# Patient Record
Sex: MALE | Race: WHITE | Employment: FULL TIME | URBAN - METROPOLITAN AREA
[De-identification: names, ages, dates, MRNs, and addresses within clinical notes are randomized per-mention and may not be internally consistent; named-entity substitution may affect disease eponyms.]

---

## 2017-07-21 ENCOUNTER — HOSPITAL ENCOUNTER (OUTPATIENT)
Dept: MRI IMAGING | Age: 32
Discharge: HOME | End: 2017-07-21
Attending: ORTHOPAEDIC SURGERY
Payer: COMMERCIAL

## 2017-07-21 DIAGNOSIS — M25.362 INSTABILITY OF LEFT KNEE JOINT: ICD-10-CM

## 2017-07-21 PROCEDURE — 73721 MRI JNT OF LWR EXTRE W/O DYE: CPT

## 2022-08-13 ENCOUNTER — APPOINTMENT (OUTPATIENT)
Dept: CT IMAGING | Age: 37
DRG: 291 | End: 2022-08-13
Attending: EMERGENCY MEDICINE
Payer: COMMERCIAL

## 2022-08-13 ENCOUNTER — APPOINTMENT (OUTPATIENT)
Dept: GENERAL RADIOLOGY | Age: 37
DRG: 291 | End: 2022-08-13
Attending: EMERGENCY MEDICINE
Payer: COMMERCIAL

## 2022-08-13 ENCOUNTER — HOSPITAL ENCOUNTER (INPATIENT)
Age: 37
LOS: 6 days | Discharge: HOME | DRG: 291 | End: 2022-08-19
Attending: EMERGENCY MEDICINE | Admitting: HOSPITALIST
Payer: COMMERCIAL

## 2022-08-13 DIAGNOSIS — I50.9 CONGESTIVE HEART FAILURE, UNSPECIFIED HF CHRONICITY, UNSPECIFIED HEART FAILURE TYPE (HCC): Primary | ICD-10-CM

## 2022-08-13 PROBLEM — R60.0 BILATERAL LEG EDEMA: Status: ACTIVE | Noted: 2022-08-13

## 2022-08-13 PROBLEM — N50.89 SCROTAL EDEMA: Status: ACTIVE | Noted: 2022-08-13

## 2022-08-13 PROBLEM — I51.7 CARDIOMEGALY: Status: ACTIVE | Noted: 2022-08-13

## 2022-08-13 LAB
ALBUMIN SERPL-MCNC: 3.2 G/DL (ref 3.5–5)
ALBUMIN/GLOB SERPL: 1.1 {RATIO} (ref 1.1–2.2)
ALP SERPL-CCNC: 98 U/L (ref 45–117)
ALT SERPL-CCNC: 52 U/L (ref 12–78)
AMPHET UR QL SCN: NEGATIVE
ANION GAP SERPL CALC-SCNC: 8 MMOL/L (ref 5–15)
APPEARANCE UR: CLEAR
AST SERPL-CCNC: 48 U/L (ref 15–37)
BACTERIA URNS QL MICRO: NEGATIVE /HPF
BARBITURATES UR QL SCN: NEGATIVE
BASOPHILS # BLD: 0.1 K/UL (ref 0–0.1)
BASOPHILS NFR BLD: 1 % (ref 0–1)
BENZODIAZ UR QL: NEGATIVE
BILIRUB SERPL-MCNC: 0.8 MG/DL (ref 0.2–1)
BILIRUB UR QL: NEGATIVE
BNP SERPL-MCNC: 4509 PG/ML
BUN SERPL-MCNC: 17 MG/DL (ref 6–20)
BUN/CREAT SERPL: 16 (ref 12–20)
CALCIUM SERPL-MCNC: 8.6 MG/DL (ref 8.5–10.1)
CANNABINOIDS UR QL SCN: NEGATIVE
CHLORIDE SERPL-SCNC: 104 MMOL/L (ref 97–108)
CO2 SERPL-SCNC: 27 MMOL/L (ref 21–32)
COCAINE UR QL SCN: NEGATIVE
COLOR UR: ABNORMAL
COMMENT, HOLDF: NORMAL
COVID-19 RAPID TEST, COVR: NOT DETECTED
CREAT SERPL-MCNC: 1.06 MG/DL (ref 0.7–1.3)
CREAT UR-MCNC: <13 MG/DL
DIFFERENTIAL METHOD BLD: ABNORMAL
DRUG SCRN COMMENT,DRGCM: NORMAL
EOSINOPHIL # BLD: 0.1 K/UL (ref 0–0.4)
EOSINOPHIL NFR BLD: 2 % (ref 0–7)
EPITH CASTS URNS QL MICRO: ABNORMAL /LPF
ERYTHROCYTE [DISTWIDTH] IN BLOOD BY AUTOMATED COUNT: 14.7 % (ref 11.5–14.5)
GLOBULIN SER CALC-MCNC: 3 G/DL (ref 2–4)
GLUCOSE SERPL-MCNC: 120 MG/DL (ref 65–100)
GLUCOSE UR STRIP.AUTO-MCNC: NEGATIVE MG/DL
HCT VFR BLD AUTO: 44.2 % (ref 36.6–50.3)
HGB BLD-MCNC: 14 G/DL (ref 12.1–17)
HGB UR QL STRIP: NEGATIVE
IMM GRANULOCYTES # BLD AUTO: 0 K/UL (ref 0–0.04)
IMM GRANULOCYTES NFR BLD AUTO: 0 % (ref 0–0.5)
KETONES UR QL STRIP.AUTO: NEGATIVE MG/DL
LACTATE SERPL-SCNC: 1.4 MMOL/L (ref 0.4–2)
LEUKOCYTE ESTERASE UR QL STRIP.AUTO: NEGATIVE
LYMPHOCYTES # BLD: 1.1 K/UL (ref 0.8–3.5)
LYMPHOCYTES NFR BLD: 17 % (ref 12–49)
MCH RBC QN AUTO: 27.7 PG (ref 26–34)
MCHC RBC AUTO-ENTMCNC: 31.7 G/DL (ref 30–36.5)
MCV RBC AUTO: 87.4 FL (ref 80–99)
METHADONE UR QL: NEGATIVE
MONOCYTES # BLD: 0.5 K/UL (ref 0–1)
MONOCYTES NFR BLD: 8 % (ref 5–13)
NEUTS SEG # BLD: 4.5 K/UL (ref 1.8–8)
NEUTS SEG NFR BLD: 72 % (ref 32–75)
NITRITE UR QL STRIP.AUTO: NEGATIVE
NRBC # BLD: 0 K/UL (ref 0–0.01)
NRBC BLD-RTO: 0 PER 100 WBC
OPIATES UR QL: NEGATIVE
OTHER,OTHU: ABNORMAL
PCP UR QL: NEGATIVE
PH UR STRIP: 5.5 [PH] (ref 5–8)
PLATELET # BLD AUTO: 231 K/UL (ref 150–400)
PMV BLD AUTO: 10.4 FL (ref 8.9–12.9)
POTASSIUM SERPL-SCNC: 4.2 MMOL/L (ref 3.5–5.1)
PROT SERPL-MCNC: 6.2 G/DL (ref 6.4–8.2)
PROT UR STRIP-MCNC: 100 MG/DL
PROT UR-MCNC: 7 MG/DL (ref 0–11.9)
PROT/CREAT UR-RTO: <0.5
RBC # BLD AUTO: 5.06 M/UL (ref 4.1–5.7)
RBC #/AREA URNS HPF: ABNORMAL /HPF (ref 0–5)
SAMPLES BEING HELD,HOLD: NORMAL
SODIUM SERPL-SCNC: 139 MMOL/L (ref 136–145)
SOURCE, COVRS: NORMAL
SP GR UR REFRACTOMETRY: 1.01
TROPONIN-HIGH SENSITIVITY: 85 NG/L (ref 0–76)
UROBILINOGEN UR QL STRIP.AUTO: 0.2 EU/DL (ref 0.2–1)
WBC # BLD AUTO: 6.3 K/UL (ref 4.1–11.1)
WBC URNS QL MICRO: ABNORMAL /HPF (ref 0–4)

## 2022-08-13 PROCEDURE — 81001 URINALYSIS AUTO W/SCOPE: CPT

## 2022-08-13 PROCEDURE — 36415 COLL VENOUS BLD VENIPUNCTURE: CPT

## 2022-08-13 PROCEDURE — 80307 DRUG TEST PRSMV CHEM ANLYZR: CPT

## 2022-08-13 PROCEDURE — 74011250636 HC RX REV CODE- 250/636: Performed by: EMERGENCY MEDICINE

## 2022-08-13 PROCEDURE — 93005 ELECTROCARDIOGRAM TRACING: CPT

## 2022-08-13 PROCEDURE — 71045 X-RAY EXAM CHEST 1 VIEW: CPT

## 2022-08-13 PROCEDURE — 74011250637 HC RX REV CODE- 250/637: Performed by: HOSPITALIST

## 2022-08-13 PROCEDURE — 99285 EMERGENCY DEPT VISIT HI MDM: CPT

## 2022-08-13 PROCEDURE — 83880 ASSAY OF NATRIURETIC PEPTIDE: CPT

## 2022-08-13 PROCEDURE — 84484 ASSAY OF TROPONIN QUANT: CPT

## 2022-08-13 PROCEDURE — 74011000258 HC RX REV CODE- 258: Performed by: HOSPITALIST

## 2022-08-13 PROCEDURE — 87635 SARS-COV-2 COVID-19 AMP PRB: CPT

## 2022-08-13 PROCEDURE — 74011000250 HC RX REV CODE- 250: Performed by: HOSPITALIST

## 2022-08-13 PROCEDURE — 65270000046 HC RM TELEMETRY

## 2022-08-13 PROCEDURE — 84156 ASSAY OF PROTEIN URINE: CPT

## 2022-08-13 PROCEDURE — 74011250636 HC RX REV CODE- 250/636: Performed by: HOSPITALIST

## 2022-08-13 PROCEDURE — 87040 BLOOD CULTURE FOR BACTERIA: CPT

## 2022-08-13 PROCEDURE — 85025 COMPLETE CBC W/AUTO DIFF WBC: CPT

## 2022-08-13 PROCEDURE — 83605 ASSAY OF LACTIC ACID: CPT

## 2022-08-13 PROCEDURE — 80053 COMPREHEN METABOLIC PANEL: CPT

## 2022-08-13 RX ORDER — ENOXAPARIN SODIUM 100 MG/ML
40 INJECTION SUBCUTANEOUS 2 TIMES DAILY
Status: DISCONTINUED | OUTPATIENT
Start: 2022-08-13 | End: 2022-08-19 | Stop reason: HOSPADM

## 2022-08-13 RX ORDER — ACETAMINOPHEN 325 MG/1
650 TABLET ORAL
Status: DISCONTINUED | OUTPATIENT
Start: 2022-08-13 | End: 2022-08-19 | Stop reason: HOSPADM

## 2022-08-13 RX ORDER — IBUPROFEN 800 MG/1
800 TABLET ORAL
COMMUNITY
End: 2022-08-19

## 2022-08-13 RX ORDER — ONDANSETRON 4 MG/1
4 TABLET, ORALLY DISINTEGRATING ORAL
Status: DISCONTINUED | OUTPATIENT
Start: 2022-08-13 | End: 2022-08-19 | Stop reason: HOSPADM

## 2022-08-13 RX ORDER — GUAIFENESIN 100 MG/5ML
81 LIQUID (ML) ORAL DAILY
Status: DISCONTINUED | OUTPATIENT
Start: 2022-08-14 | End: 2022-08-19 | Stop reason: HOSPADM

## 2022-08-13 RX ORDER — ASPIRIN 325 MG/1
100 TABLET, FILM COATED ORAL DAILY
Status: DISCONTINUED | OUTPATIENT
Start: 2022-08-14 | End: 2022-08-19 | Stop reason: HOSPADM

## 2022-08-13 RX ORDER — DIAZEPAM 5 MG/1
20 TABLET ORAL
Status: DISCONTINUED | OUTPATIENT
Start: 2022-08-13 | End: 2022-08-19 | Stop reason: HOSPADM

## 2022-08-13 RX ORDER — ACETAMINOPHEN 650 MG/1
650 SUPPOSITORY RECTAL
Status: DISCONTINUED | OUTPATIENT
Start: 2022-08-13 | End: 2022-08-19 | Stop reason: HOSPADM

## 2022-08-13 RX ORDER — SODIUM CHLORIDE 0.9 % (FLUSH) 0.9 %
5-40 SYRINGE (ML) INJECTION EVERY 8 HOURS
Status: DISCONTINUED | OUTPATIENT
Start: 2022-08-13 | End: 2022-08-19 | Stop reason: HOSPADM

## 2022-08-13 RX ORDER — POLYETHYLENE GLYCOL 3350 17 G/17G
17 POWDER, FOR SOLUTION ORAL DAILY PRN
Status: DISCONTINUED | OUTPATIENT
Start: 2022-08-13 | End: 2022-08-19 | Stop reason: HOSPADM

## 2022-08-13 RX ORDER — ONDANSETRON 2 MG/ML
4 INJECTION INTRAMUSCULAR; INTRAVENOUS
Status: DISCONTINUED | OUTPATIENT
Start: 2022-08-13 | End: 2022-08-19 | Stop reason: HOSPADM

## 2022-08-13 RX ORDER — DIPHENHYDRAMINE HCL 12.5MG/5ML
25 LIQUID (ML) ORAL
Status: DISCONTINUED | OUTPATIENT
Start: 2022-08-13 | End: 2022-08-19 | Stop reason: HOSPADM

## 2022-08-13 RX ORDER — GUAIFENESIN 100 MG/5ML
81 LIQUID (ML) ORAL DAILY
COMMUNITY

## 2022-08-13 RX ORDER — FOLIC ACID 1 MG/1
1 TABLET ORAL DAILY
Status: DISCONTINUED | OUTPATIENT
Start: 2022-08-14 | End: 2022-08-19 | Stop reason: HOSPADM

## 2022-08-13 RX ORDER — FUROSEMIDE 10 MG/ML
60 INJECTION INTRAMUSCULAR; INTRAVENOUS ONCE
Status: COMPLETED | OUTPATIENT
Start: 2022-08-13 | End: 2022-08-13

## 2022-08-13 RX ORDER — POTASSIUM CHLORIDE 20 MEQ/1
20 TABLET, EXTENDED RELEASE ORAL DAILY
Status: DISCONTINUED | OUTPATIENT
Start: 2022-08-14 | End: 2022-08-15

## 2022-08-13 RX ORDER — SODIUM CHLORIDE 0.9 % (FLUSH) 0.9 %
5-40 SYRINGE (ML) INJECTION AS NEEDED
Status: DISCONTINUED | OUTPATIENT
Start: 2022-08-13 | End: 2022-08-19 | Stop reason: HOSPADM

## 2022-08-13 RX ORDER — LISINOPRIL 10 MG/1
10 TABLET ORAL DAILY
Status: DISCONTINUED | OUTPATIENT
Start: 2022-08-13 | End: 2022-08-16

## 2022-08-13 RX ORDER — FUROSEMIDE 10 MG/ML
60 INJECTION INTRAMUSCULAR; INTRAVENOUS 2 TIMES DAILY
Status: DISCONTINUED | OUTPATIENT
Start: 2022-08-13 | End: 2022-08-16

## 2022-08-13 RX ORDER — DIAZEPAM 5 MG/1
10 TABLET ORAL
Status: DISCONTINUED | OUTPATIENT
Start: 2022-08-13 | End: 2022-08-19 | Stop reason: HOSPADM

## 2022-08-13 RX ADMIN — VANCOMYCIN HYDROCHLORIDE 2500 MG: 10 INJECTION, POWDER, LYOPHILIZED, FOR SOLUTION INTRAVENOUS at 18:16

## 2022-08-13 RX ADMIN — SODIUM CHLORIDE 1 G: 900 INJECTION INTRAVENOUS at 21:58

## 2022-08-13 RX ADMIN — SODIUM CHLORIDE, PRESERVATIVE FREE 10 ML: 5 INJECTION INTRAVENOUS at 18:29

## 2022-08-13 RX ADMIN — SODIUM CHLORIDE 500 ML: 9 INJECTION, SOLUTION INTRAVENOUS at 14:59

## 2022-08-13 RX ADMIN — FUROSEMIDE 60 MG: 10 INJECTION, SOLUTION INTRAMUSCULAR; INTRAVENOUS at 16:51

## 2022-08-13 RX ADMIN — ENOXAPARIN SODIUM 40 MG: 100 INJECTION SUBCUTANEOUS at 21:56

## 2022-08-13 RX ADMIN — SODIUM CHLORIDE, PRESERVATIVE FREE 10 ML: 5 INJECTION INTRAVENOUS at 21:57

## 2022-08-13 RX ADMIN — FUROSEMIDE 60 MG: 10 INJECTION, SOLUTION INTRAMUSCULAR; INTRAVENOUS at 20:05

## 2022-08-13 RX ADMIN — LISINOPRIL 10 MG: 5 TABLET ORAL at 18:27

## 2022-08-13 NOTE — PROGRESS NOTES
Report received from Mika Silverio, [facility], later [facility].

## 2022-08-13 NOTE — ED NOTES
TRANSFER - OUT REPORT:    Verbal report given to Liv(name) on Mel Denney  being transferred to CDU (unit) for routine progression of care       Report consisted of patients Situation, Background, Assessment and   Recommendations(SBAR). Information from the following report(s) SBAR, Kardex, ED Summary, MAR, and Recent Results was reviewed with the receiving nurse. Lines:   Peripheral IV 08/13/22 Right Antecubital (Active)   Site Assessment Clean, dry, & intact 08/13/22 1455   Phlebitis Assessment 0 08/13/22 1455   Infiltration Assessment 0 08/13/22 1455   Dressing Status Clean, dry, & intact 08/13/22 1455   Dressing Type Tape;Transparent 08/13/22 1455   Hub Color/Line Status Pink 08/13/22 1455        Opportunity for questions and clarification was provided.

## 2022-08-13 NOTE — H&P
Hospitalist Admission Note    NAME: Karolina Kessler   :  1985   MRN:  [id number]     Date/Time:  2022 4:58 PM    Patient PCP: None    Please note that this dictation was completed with [employer], the computer voice recognition software. Quite often unanticipated grammatical, syntax, homophones, and other interpretive errors are inadvertently transcribed by the computer software. Please disregard these errors. Please excuse any errors that have escaped final proofreading  ______________________________________________________________________   Assessment & Plan:  New onset chf, POA with b/l tense leg edema, abdominal wall edema, scrotal edema, elevated probnp, weight gain of 30# in 3 weeks  --due to undetected HTN; ?nephrotic syndrome since has proteinuria  --start on lasix 60mg IV bid  --echo  --ekg and troponin pending  --cardiology consult  --check tsh  --check UDS    Elevated blood pressures likely HTN  --/112  --start lisinopril    Proteinuria, r/o nephrotic syndrome  --check urine protein/Cr ratio  --consider nephrology consult    Bilateral leg cellulitis and abdominal wall cellulitis, POA  Sinus tachycardia  --check blood cultures  --mrsa nasal swab  --put on rocephin and vancomycin    Excessive alcohol use  --drinks 20-25 shots of bourbon or pints of beer a week  --monitor for withdrawal  --MVI, thiamine, folic acid. --CIWA with prn oral valium    Elevated AST  --may be from hepatic congestion from chf or alcohol. Monitor    Obesity  Body mass index is 46.02 kg/m². Code: full  DVT prophylaxis: lovenox  Surrogate decision maker:  wife [facility] AURA BARRAGAN [phone]    Needs a pcp at discharge.   Does not go see a doctor regularly        Subjective:   CHIEF COMPLAINT:  testicular swelling, b/l leg swelling, SOB, orthopnea    HISTORY OF PRESENT ILLNESS:     Karolina Kessler is a 44 y.o. male with PMH papilledema treated with serial LP for pressure reduction, Bell's palsy affecting left face presents for above symptoms. Patient with 3 weeks of progressive SOB, primarily MORENO, orthopnea. For past 3 weeks, he has been unable to lay down at night to sleep and been sleeping in on his knees in prayer position leaning against bed. About 2 weeks ago, developed a rash in b/l medial thighs with redness, blisters and swelling that spread distally into lower legs and feet. Rash was itching. He applied lotrimin cream and itching resolved. He also has calluses in heels and right heel cracked and bled for a short time. He has also developed swelling, redness of abdominal wall. This morning, testicles are swollen which prompted him to come to ER for evaluation. CXR shows CMO, mild pulmonary vascular prominence and left pleural effusion vs. Left hemidiaphragm elevation. CT abdomen/chest ordered by ER but he's unable to complete due to orthopnea. We were asked to admit for work up and evaluation of the above problems. Past Medical History:   Diagnosis Date    Bell's palsy     x 2; left side    Papilledema of both eyes due to increased intracranial pressure       History reviewed. No pertinent surgical history. Social History     Tobacco Use    Smoking status: Not on file    Smokeless tobacco: Not on file   Substance Use Topics    Alcohol use: Not on file      Drug use:        Family History   Problem Relation Age of Onset    Thyroid Disease Mother     Cancer Paternal Grandmother     Cancer Paternal Grandfather       Allergies   Allergen Reactions    Penicillins Hives        Prior to Admission medications    Medication Sig Start Date End Date Taking? Authorizing Provider   aspirin 81 mg chewable tablet Take 81 mg by mouth in the morning. Yes Other, MD Jelena     REVIEW OF SYSTEMS:  POSITIVE= Bold.   Negative = normal text  General:  fever, chills, sweats, generalized weakness, weight gain 30#, loss of appetite  Eyes:  blurred vision, eye pain, loss of vision, diplopia  Ear Nose and Throat: rhinorrhea, pharyngitis  Respiratory:   cough, sputum production, SOB, wheezing, MORENO, pleuritic pain  Cardiology:  chest pain, palpitations, orthopnea, PND, edema, syncope   Gastrointestinal:  abdominal pain, N/V, dysphagia, diarrhea, constipation, bleeding  Genitourinary:  frequency, urgency, dysuria, hematuria, incontinence  Muskuloskeletal :  arthralgia, myalgia  Hematology:  easy bruising, bleeding, lymphadenopathy  Dermatological:  rash, ulceration, pruritis  Endocrine:  hot flashes or polydipsia  Neurological:  headache, dizziness, confusion, focal weakness, paresthesia, memory loss, gait disturbance  Psychological: anxiety, depression, agitation      Objective:   VITALS:    Visit Vitals  /87   Pulse (!) 104   Temp 98.1 °F (36.7 °C)   Resp 20   Ht 6' 3\" (1.905 m)   Wt (!) 167 kg (368 lb 2.7 oz)   SpO2 97%   BMI 46.02 kg/m²     Temp (24hrs), Av.1 °F (36.7 °C), Min:98.1 °F (36.7 °C), Max:98.1 °F (36.7 °C)    Body mass index is 46.02 kg/m². PHYSICAL EXAM:    General:    Alert, overweight male, cooperative, no distress, appears stated age. HEENT: Atraumatic, anicteric sclerae, pink conjunctivae     No oral ulcers, mucosa moist, throat clear. Hearing intact. Neck:  Supple, symmetrical,  thyroid: non tender  Lungs:   B/l lower lobe crackles. No Wheezing or Rhonchi. Chest wall:  No tenderness  No Accessory muscle use. Heart:   Regular  rhythm,  tachycardic, No  murmur   No gallop. Tense edema entire legs and feet b/l  Abdomen:   Soft, non-tender. Not distended. Bowel sounds normal. No masses. +redness and soft tissue of abdominal wall with increased warmth.  +striae  Extremities: No cyanosis. No clubbing. Bilateral legs red from toes to thighs. Hyperpigmented scarring right medial thigh, dried scaly skin left thigh from excoriation. Calluses heels that are cracked.   Skin:     Not pale Not Jaundiced  Diffuse redness with increased warmth abdominal wall and lower legs.                    Psych:  Good insight. Not depressed. Not anxious or agitated. Neurologic: EOMs intact. No facial asymmetry. No aphasia or slurred speech. Symmetrical strength, Alert and oriented X 3. Peripheral pulse: Right, DP, 2+, left DP 1+ but limited by tense edema feet    IMAGING RESULTS:   []       I have personally reviewed the actual   []     CXR  []     CT scan  CXR:  CT :  EKG:   ________________________________________________________________________  Care Plan discussed with:    Comments   Patient y    [facility]:      ________________________________________________________________________  Prophylaxis:  GI none   DVT lovenox   ________________________________________________________________________  Recommended Disposition:   Home with Family y   HH/PT/OT/RN    SNF/LTC    MATTY    ________________________________________________________________________  Code Status:  Full Code y   DNR/DNI    ________________________________________________________________________  TOTAL TIME:  55 minutes      Comments     Reviewed previous records   >50% of visit spent in counseling and coordination of care  Discussion with patient and/or family and questions answered         ______________________________________________________________________  Lamont Santos MD      Procedures: see electronic medical records for all procedures/Xrays and details which were not copied into this note but were reviewed prior to creation of Plan.     LAB DATA REVIEWED:    Recent Results (from the past 24 hour(s))   CBC WITH AUTOMATED DIFF    Collection Time: 08/13/22  2:52 PM   Result Value Ref Range    WBC 6.3 4.1 - 11.1 K/uL    RBC 5.06 4.10 - 5.70 M/uL    HGB 14.0 12.1 - 17.0 g/dL    HCT 44.2 36.6 - 50.3 %    MCV 87.4 80.0 - 99.0 FL    MCH 27.7 26.0 - 34.0 PG    MCHC 31.7 30.0 - 36.5 g/dL    RDW 14.7 (H) 11.5 - 14.5 %    PLATELET 311 151 - 595 K/uL    MPV 10.4 8.9 - 12.9 FL    NRBC 0.0 0  WBC    ABSOLUTE NRBC 0.00 0.00 - 0.01 K/uL    NEUTROPHILS 72 32 - 75 %    LYMPHOCYTES 17 12 - 49 %    MONOCYTES 8 5 - 13 %    EOSINOPHILS 2 0 - 7 %    BASOPHILS 1 0 - 1 %    IMMATURE GRANULOCYTES 0 0.0 - 0.5 %    ABS. NEUTROPHILS 4.5 1.8 - 8.0 K/UL    ABS. LYMPHOCYTES 1.1 0.8 - 3.5 K/UL    ABS. MONOCYTES 0.5 0.0 - 1.0 K/UL    ABS. EOSINOPHILS 0.1 0.0 - 0.4 K/UL    ABS. BASOPHILS 0.1 0.0 - 0.1 K/UL    ABS. IMM. GRANS. 0.0 0.00 - 0.04 K/UL    DF AUTOMATED     NT-PRO BNP    Collection Time: 08/13/22  2:52 PM   Result Value Ref Range    NT pro-BNP 4,509 (H) <780 PG/ML   METABOLIC PANEL, COMPREHENSIVE    Collection Time: 08/13/22  2:52 PM   Result Value Ref Range    Sodium 139 136 - 145 mmol/L    Potassium 4.2 3.5 - 5.1 mmol/L    Chloride 104 97 - 108 mmol/L    CO2 27 21 - 32 mmol/L    Anion gap 8 5 - 15 mmol/L    Glucose 120 (H) 65 - 100 mg/dL    BUN 17 6 - 20 MG/DL    Creatinine 1.06 0.70 - 1.30 MG/DL    BUN/Creatinine ratio 16 12 - 20      GFR est AA >60 >60 ml/min/1.73m2    GFR est non-AA >60 >60 ml/min/1.73m2    Calcium 8.6 8.5 - 10.1 MG/DL    Bilirubin, total 0.8 0.2 - 1.0 MG/DL    ALT (SGPT) 52 12 - 78 U/L    AST (SGOT) 48 (H) 15 - 37 U/L    Alk.  phosphatase 98 45 - 117 U/L    Protein, total 6.2 (L) 6.4 - 8.2 g/dL    Albumin 3.2 (L) 3.5 - 5.0 g/dL    Globulin 3.0 2.0 - 4.0 g/dL    A-G Ratio 1.1 1.1 - 2.2     URINALYSIS W/ RFLX MICROSCOPIC    Collection Time: 08/13/22  2:52 PM   Result Value Ref Range    Color YELLOW/STRAW      Appearance CLEAR CLEAR      Specific gravity 1.010      pH (UA) 5.5 5.0 - 8.0      Protein 100 (A) NEG mg/dL    Glucose Negative NEG mg/dL    Ketone Negative NEG mg/dL    Bilirubin Negative NEG      Blood Negative NEG      Urobilinogen 0.2 0.2 - 1.0 EU/dL    Nitrites Negative NEG      Leukocyte Esterase Negative NEG      WBC 0-4 0 - 4 /hpf    RBC 0-5 0 - 5 /hpf    Epithelial cells MODERATE (A) FEW /lpf    Bacteria Negative NEG /hpf    Other: Renal Epithelial cells Present     TROPONIN-HIGH SENSITIVITY    Collection Time: 08/13/22  2:52 PM   Result Value Ref Range    Troponin-High Sensitivity 85 (H) 0 - 76 ng/L   SAMPLES BEING HELD    Collection Time: 08/13/22  4:44 PM   Result Value Ref Range    SAMPLES BEING HELD PST     COMMENT        Add-on orders for these samples will be processed based on acceptable specimen integrity and analyte stability, which may vary by analyte.    EKG, 12 LEAD, INITIAL    Collection Time: 08/13/22  4:50 PM   Result Value Ref Range    Ventricular Rate 104 BPM    Atrial Rate 104 BPM    P-R Interval 132 ms    QRS Duration 86 ms    Q-T Interval 360 ms    QTC Calculation (Bezet) 473 ms    Calculated P Axis 53 degrees    Calculated R Axis 36 degrees    Calculated T Axis 75 degrees    Diagnosis       Sinus tachycardia with occasional premature ventricular complexes  Possible Left atrial enlargement  Nonspecific T wave abnormality  No previous ECGs available

## 2022-08-13 NOTE — PROGRESS NOTES
Pharmacy Antimicrobial Kinetic Dosing    Indication for Antimicrobials: Cellulitis      Current Regimen of Each Antimicrobial:  Vancomycin 2500 mg (max) load then per pharmacy (Start Date 22; Day # 1)  Ceftriaxone 1 gm q24h start  day 1 of 7    Previous Antimicrobial Therapy:   (Start Date ; Day    Goal Level: AUC: 400-600 mg/hr/Liter/day    Date Dose & Interval Measured (mcg/mL) Predicted AUC/GARY                       Date & time of next level: TBD    Dosing calculator used: [employer] calculator    Significant Positive Cultures:   CT of abd. - Pending       Conditions for Dosing Consideration: OBESE BMI 46.02; Wt verified with ER RN  ED notes indicates patient has gained 30 lbs in 3 weeks. Labs:  Recent Labs     22  1452   CREA 1.06   BUN 17     Recent Labs     22  1452   WBC 6.3     Temp (24hrs), Av.1 °F (36.7 °C), Min:98.1 °F (36.7 °C), Max:98.1 °F (36.7 °C)        Creatinine Clearance (mL/min):   CrCl (Ideal Body Weight): 112.9   If actual weight < IBW: CrCl (Actual Body Weight) 223.2    Impression/Plan:   Vancomycin Load =14.9 mg/kg = 2500 mg (Max dose). Maintenance dose of 1000 mg q8h predicted AUC//15.2    Continue Ceftriaxone 1 gm Q24H  Antimicrobial stop date Ceftriaxone 7 days; Vanc TBD      Pharmacy will follow daily and adjust medications as appropriate for renal function and/or serum levels.     Thank you,  Vivian Gibbs, [facility]    Vancomycin Dosing Document    Documents located on pharmacy Teams site: Clinical Practice -> Antimicrobial Stewardship -> Antibiotics_Vancomycin     Aminoglycoside Dosing Document    Documents located on pharmacy Teams site: Clinical Practice -> Antimicrobial Stewardship -> Antibiotics_Aminoglycosides

## 2022-08-13 NOTE — PROGRESS NOTES
P&T-Approved DVT Prophylaxis Dosing    Per P&T Committee-approved protocol enoxaparin has been adjusted to enoxaparin 40mg SQ BID based on weight and renal function as shown in the table below.          TIMOTHY Patterson

## 2022-08-13 NOTE — ED PROVIDER NOTES
EMERGENCY DEPARTMENT HISTORY AND PHYSICAL EXAM      Date: 8/13/2022  Patient Name: Natalio Lebron    History of Presenting Illness     Chief Complaint   Patient presents with    Testicle Pain     Woke up this morning with testicular swelling \"three times \" swollen    Abdominal Pain     Five days of bloated and tightness in abdomen    Peripheral Edema     Woke up just less that two weeks ago with swelling in both lower legs with tightness and blisters that he scratched and popped. HPI: Natalio Lebron, 44 y.o. male with no significant medical history presenting to ED with abdominal swelling, testicle swelling, leg swelling, rash. He states that several days ago, he had a rash on his lower extremities. This improved with steroid cream.  Since then, he has noted substantial swelling to the above places. He states that they're not painful. He denies any abscesses. He denies fever. He denies nausea, vomiting, diarrhea. He has no respiratory complaints. He has no chest pain. He denies medication use. He denies recent travel. He denies any contacts with sick people. Further inquiry, patient states that he has had difficulty laying down to sleep at night and has been laying on his bed with his knees on the ground to sleep. This is been ongoing for about a week. He states that he feels a little bit short of breath when he walks but is fine at rest when sitting up. There are no other complaints, changes, or physical findings at this time. PCP: None    No current facility-administered medications on file prior to encounter. Current Outpatient Medications on File Prior to Encounter   Medication Sig Dispense Refill    aspirin 81 mg chewable tablet Take 81 mg by mouth in the morning.          Past History     Past Medical History:  Past Medical History:   Diagnosis Date    Bell's palsy     x 2; left side    Papilledema of both eyes due to increased intracranial pressure        Past Surgical History:  History reviewed. No pertinent surgical history. Family History:  Family History   Problem Relation Age of Onset    Thyroid Disease Mother     Cancer Paternal Grandmother     Cancer Paternal Grandfather        Social History: Allergies: Allergies   Allergen Reactions    Penicillins Hives         Review of Systems   Review of Systems   Constitutional:  Negative for chills and fever. HENT:  Negative for sore throat. Eyes:  Negative for redness. Respiratory:  Negative for shortness of breath. Cardiovascular:  Positive for leg swelling. Negative for chest pain. Gastrointestinal:  Positive for abdominal pain. Genitourinary:  Positive for scrotal swelling. Negative for dysuria. Musculoskeletal:  Negative for back pain. Skin:  Positive for rash. Neurological:  Negative for syncope. Psychiatric/Behavioral:  The patient is not nervous/anxious. All other systems reviewed and are negative. Physical Exam   Physical Exam  Vitals and nursing note reviewed. Constitutional:       Appearance: Normal appearance. HENT:      Head: Normocephalic and atraumatic. Mouth/Throat:      Mouth: Mucous membranes are moist.   Cardiovascular:      Rate and Rhythm: Normal rate and regular rhythm. Pulmonary:      Effort: Pulmonary effort is normal.      Breath sounds: Normal breath sounds. Abdominal:      Palpations: Abdomen is soft. Tenderness: There is no abdominal tenderness. Comments: 2+ pitting edema noted from legs up to midabdomen and including scrotum. No underlying TTP. Erythema noted to abdomen and upper legs but sparing scrotum. No underlying warmth. No perineal pain. No abscessed noted. Musculoskeletal:         General: No deformity. Cervical back: Neck supple. Skin:     General: Skin is warm and dry. Neurological:      General: No focal deficit present. Mental Status: He is alert.    Psychiatric:         Mood and Affect: Mood normal.         Behavior: Behavior normal.       Diagnostic Study Results     Labs -     Recent Results (from the past 24 hour(s))   CBC WITH AUTOMATED DIFF    Collection Time: 08/13/22  2:52 PM   Result Value Ref Range    WBC 6.3 4.1 - 11.1 K/uL    RBC 5.06 4.10 - 5.70 M/uL    HGB 14.0 12.1 - 17.0 g/dL    HCT 44.2 36.6 - 50.3 %    MCV 87.4 80.0 - 99.0 FL    MCH 27.7 26.0 - 34.0 PG    MCHC 31.7 30.0 - 36.5 g/dL    RDW 14.7 (H) 11.5 - 14.5 %    PLATELET 041 339 - 264 K/uL    MPV 10.4 8.9 - 12.9 FL    NRBC 0.0 0  WBC    ABSOLUTE NRBC 0.00 0.00 - 0.01 K/uL    NEUTROPHILS 72 32 - 75 %    LYMPHOCYTES 17 12 - 49 %    MONOCYTES 8 5 - 13 %    EOSINOPHILS 2 0 - 7 %    BASOPHILS 1 0 - 1 %    IMMATURE GRANULOCYTES 0 0.0 - 0.5 %    ABS. NEUTROPHILS 4.5 1.8 - 8.0 K/UL    ABS. LYMPHOCYTES 1.1 0.8 - 3.5 K/UL    ABS. MONOCYTES 0.5 0.0 - 1.0 K/UL    ABS. EOSINOPHILS 0.1 0.0 - 0.4 K/UL    ABS. BASOPHILS 0.1 0.0 - 0.1 K/UL    ABS. IMM. GRANS. 0.0 0.00 - 0.04 K/UL    DF AUTOMATED     NT-PRO BNP    Collection Time: 08/13/22  2:52 PM   Result Value Ref Range    NT pro-BNP 4,509 (H) <010 PG/ML   METABOLIC PANEL, COMPREHENSIVE    Collection Time: 08/13/22  2:52 PM   Result Value Ref Range    Sodium 139 136 - 145 mmol/L    Potassium 4.2 3.5 - 5.1 mmol/L    Chloride 104 97 - 108 mmol/L    CO2 27 21 - 32 mmol/L    Anion gap 8 5 - 15 mmol/L    Glucose 120 (H) 65 - 100 mg/dL    BUN 17 6 - 20 MG/DL    Creatinine 1.06 0.70 - 1.30 MG/DL    BUN/Creatinine ratio 16 12 - 20      GFR est AA >60 >60 ml/min/1.73m2    GFR est non-AA >60 >60 ml/min/1.73m2    Calcium 8.6 8.5 - 10.1 MG/DL    Bilirubin, total 0.8 0.2 - 1.0 MG/DL    ALT (SGPT) 52 12 - 78 U/L    AST (SGOT) 48 (H) 15 - 37 U/L    Alk.  phosphatase 98 45 - 117 U/L    Protein, total 6.2 (L) 6.4 - 8.2 g/dL    Albumin 3.2 (L) 3.5 - 5.0 g/dL    Globulin 3.0 2.0 - 4.0 g/dL    A-G Ratio 1.1 1.1 - 2.2     URINALYSIS W/ RFLX MICROSCOPIC    Collection Time: 08/13/22  2:52 PM   Result Value Ref Range    Color YELLOW/STRAW Appearance CLEAR CLEAR      Specific gravity 1.010      pH (UA) 5.5 5.0 - 8.0      Protein 100 (A) NEG mg/dL    Glucose Negative NEG mg/dL    Ketone Negative NEG mg/dL    Bilirubin Negative NEG      Blood Negative NEG      Urobilinogen 0.2 0.2 - 1.0 EU/dL    Nitrites Negative NEG      Leukocyte Esterase Negative NEG      WBC 0-4 0 - 4 /hpf    RBC 0-5 0 - 5 /hpf    Epithelial cells MODERATE (A) FEW /lpf    Bacteria Negative NEG /hpf    Other: Renal Epithelial cells Present     TROPONIN-HIGH SENSITIVITY    Collection Time: 08/13/22  2:52 PM   Result Value Ref Range    Troponin-High Sensitivity 85 (H) 0 - 76 ng/L   COVID-19 RAPID TEST    Collection Time: 08/13/22  4:44 PM   Result Value Ref Range    Specimen source Nasopharyngeal      COVID-19 rapid test Not detected NOTD     DRUG SCREEN, URINE    Collection Time: 08/13/22  4:44 PM   Result Value Ref Range    AMPHETAMINES Negative NEG      BARBITURATES Negative NEG      BENZODIAZEPINES Negative NEG      COCAINE Negative NEG      METHADONE Negative NEG      OPIATES Negative NEG      PCP(PHENCYCLIDINE) Negative NEG      THC (TH-CANNABINOL) Negative NEG      Drug screen comment (NOTE)    SAMPLES BEING HELD    Collection Time: 08/13/22  4:44 PM   Result Value Ref Range    SAMPLES BEING HELD PST     COMMENT        Add-on orders for these samples will be processed based on acceptable specimen integrity and analyte stability, which may vary by analyte.    EKG, 12 LEAD, INITIAL    Collection Time: 08/13/22  4:50 PM   Result Value Ref Range    Ventricular Rate 104 BPM    Atrial Rate 104 BPM    P-R Interval 132 ms    QRS Duration 86 ms    Q-T Interval 360 ms    QTC Calculation (Bezet) 473 ms    Calculated P Axis 53 degrees    Calculated R Axis 36 degrees    Calculated T Axis 75 degrees    Diagnosis       Sinus tachycardia with occasional premature ventricular complexes  Possible Left atrial enlargement  Nonspecific T wave abnormality  No previous ECGs available     LACTIC ACID Collection Time: 08/13/22  5:38 PM   Result Value Ref Range    Lactic acid 1.4 0.4 - 2.0 MMOL/L   PROTEIN/CREATININE RATIO, URINE    Collection Time: 08/13/22  5:42 PM   Result Value Ref Range    Protein, urine random 7 0.0 - 11.9 mg/dL    Creatinine, urine <13.00 mg/dL    Protein/Creat. urine Ratio <0.5        Radiologic Studies -   XR CHEST PORT   Final Result   Cardiomegaly with mild vascular prominence and elevated left   hemidiaphragm. CT ABD PELV W CONT    (Results Pending)   CTA CHEST W OR W WO CONT    (Results Pending)     CT Results  (Last 48 hours)      None          CXR Results  (Last 48 hours)                 08/13/22 1441  XR CHEST PORT Final result    Impression:  Cardiomegaly with mild vascular prominence and elevated left   hemidiaphragm. Narrative:  Clinical indication: Shortness of breath. AP portable erect view of the chest obtained. No prior. The heart size is   prominent. There is mild interstitial prominence. Elevation the left   hemidiaphragm is nonspecific but could be pleural effusion. Medical Decision Making   I am the first provider for this patient. I reviewed the vital signs, available nursing notes, past medical history, past surgical history, family history and social history. Vital Signs-Reviewed the patient's vital signs. Patient Vitals for the past 24 hrs:   Temp Pulse Resp BP SpO2   08/13/22 1645 -- (!) 104 20 137/87 97 %   08/13/22 1615 -- 96 23 (!) 139/93 97 %   08/13/22 1530 -- 94 25 121/74 90 %   08/13/22 1500 -- (!) 110 29 (!) 138/91 93 %   08/13/22 1451 -- (!) 112 27 (!) 134/107 94 %   08/13/22 1406 98.1 °F (36.7 °C) (!) 115 18 (!) 151/112 96 %         Provider Notes (Medical Decision Making):   69-year-old with new onset congestive heart failure. He will be admitted to hospitalist service for further management. ED Course:     Initial assessment performed.  The patients presenting problems have been discussed, and they are in agreement with the care plan formulated and outlined with them. I have encouraged them to ask questions as they arise throughout their visit. Disposition:  admit    PLAN:  1. Current Discharge Medication List        2.    Follow-up Information    None       Return to ED if worse     Diagnosis     Clinical Impression: CHF

## 2022-08-14 ENCOUNTER — APPOINTMENT (OUTPATIENT)
Dept: ULTRASOUND IMAGING | Age: 37
DRG: 291 | End: 2022-08-14
Attending: INTERNAL MEDICINE
Payer: COMMERCIAL

## 2022-08-14 ENCOUNTER — APPOINTMENT (OUTPATIENT)
Dept: CT IMAGING | Age: 37
DRG: 291 | End: 2022-08-14
Attending: EMERGENCY MEDICINE
Payer: COMMERCIAL

## 2022-08-14 LAB
ALBUMIN SERPL-MCNC: 3.1 G/DL (ref 3.5–5)
ALBUMIN/GLOB SERPL: 1 {RATIO} (ref 1.1–2.2)
ALP SERPL-CCNC: 99 U/L (ref 45–117)
ALT SERPL-CCNC: 49 U/L (ref 12–78)
ANION GAP SERPL CALC-SCNC: 5 MMOL/L (ref 5–15)
AST SERPL-CCNC: 46 U/L (ref 15–37)
ATRIAL RATE: 104 BPM
BILIRUB SERPL-MCNC: 1.2 MG/DL (ref 0.2–1)
BUN SERPL-MCNC: 17 MG/DL (ref 6–20)
BUN/CREAT SERPL: 16 (ref 12–20)
CALCIUM SERPL-MCNC: 8.8 MG/DL (ref 8.5–10.1)
CALCULATED P AXIS, ECG09: 53 DEGREES
CALCULATED R AXIS, ECG10: 36 DEGREES
CALCULATED T AXIS, ECG11: 75 DEGREES
CHLORIDE SERPL-SCNC: 100 MMOL/L (ref 97–108)
CO2 SERPL-SCNC: 32 MMOL/L (ref 21–32)
CREAT SERPL-MCNC: 1.08 MG/DL (ref 0.7–1.3)
D DIMER PPP FEU-MCNC: 1.57 MG/L FEU (ref 0–0.65)
DIAGNOSIS, 93000: NORMAL
ERYTHROCYTE [DISTWIDTH] IN BLOOD BY AUTOMATED COUNT: 14.5 % (ref 11.5–14.5)
GLOBULIN SER CALC-MCNC: 3 G/DL (ref 2–4)
GLUCOSE SERPL-MCNC: 93 MG/DL (ref 65–100)
HCT VFR BLD AUTO: 45.2 % (ref 36.6–50.3)
HGB BLD-MCNC: 14.3 G/DL (ref 12.1–17)
MAGNESIUM SERPL-MCNC: 1.7 MG/DL (ref 1.6–2.4)
MCH RBC QN AUTO: 28 PG (ref 26–34)
MCHC RBC AUTO-ENTMCNC: 31.6 G/DL (ref 30–36.5)
MCV RBC AUTO: 88.6 FL (ref 80–99)
NRBC # BLD: 0 K/UL (ref 0–0.01)
NRBC BLD-RTO: 0 PER 100 WBC
P-R INTERVAL, ECG05: 132 MS
PLATELET # BLD AUTO: 220 K/UL (ref 150–400)
PMV BLD AUTO: 10.8 FL (ref 8.9–12.9)
POTASSIUM SERPL-SCNC: 3.6 MMOL/L (ref 3.5–5.1)
PROT SERPL-MCNC: 6.1 G/DL (ref 6.4–8.2)
Q-T INTERVAL, ECG07: 360 MS
QRS DURATION, ECG06: 86 MS
QTC CALCULATION (BEZET), ECG08: 473 MS
RBC # BLD AUTO: 5.1 M/UL (ref 4.1–5.7)
SODIUM SERPL-SCNC: 137 MMOL/L (ref 136–145)
TSH SERPL DL<=0.05 MIU/L-ACNC: 2.49 UIU/ML (ref 0.36–3.74)
VENTRICULAR RATE, ECG03: 104 BPM
WBC # BLD AUTO: 8 K/UL (ref 4.1–11.1)

## 2022-08-14 PROCEDURE — 74011000258 HC RX REV CODE- 258: Performed by: HOSPITALIST

## 2022-08-14 PROCEDURE — 65270000046 HC RM TELEMETRY

## 2022-08-14 PROCEDURE — 74011250637 HC RX REV CODE- 250/637: Performed by: HOSPITALIST

## 2022-08-14 PROCEDURE — 74011000250 HC RX REV CODE- 250: Performed by: HOSPITALIST

## 2022-08-14 PROCEDURE — 83735 ASSAY OF MAGNESIUM: CPT

## 2022-08-14 PROCEDURE — 93970 EXTREMITY STUDY: CPT

## 2022-08-14 PROCEDURE — 85379 FIBRIN DEGRADATION QUANT: CPT

## 2022-08-14 PROCEDURE — 74011250636 HC RX REV CODE- 250/636: Performed by: HOSPITALIST

## 2022-08-14 PROCEDURE — 80053 COMPREHEN METABOLIC PANEL: CPT

## 2022-08-14 PROCEDURE — 36415 COLL VENOUS BLD VENIPUNCTURE: CPT

## 2022-08-14 PROCEDURE — 74011250637 HC RX REV CODE- 250/637: Performed by: INTERNAL MEDICINE

## 2022-08-14 PROCEDURE — 84443 ASSAY THYROID STIM HORMONE: CPT

## 2022-08-14 PROCEDURE — 85027 COMPLETE CBC AUTOMATED: CPT

## 2022-08-14 RX ORDER — LORAZEPAM 1 MG/1
2 TABLET ORAL AS NEEDED
Status: DISCONTINUED | OUTPATIENT
Start: 2022-08-14 | End: 2022-08-14

## 2022-08-14 RX ORDER — IBUPROFEN 200 MG
1 TABLET ORAL DAILY
Status: DISCONTINUED | OUTPATIENT
Start: 2022-08-15 | End: 2022-08-19 | Stop reason: HOSPADM

## 2022-08-14 RX ORDER — LORAZEPAM 1 MG/1
1 TABLET ORAL
Status: COMPLETED | OUTPATIENT
Start: 2022-08-14 | End: 2022-08-14

## 2022-08-14 RX ORDER — LANOLIN ALCOHOL/MO/W.PET/CERES
400 CREAM (GRAM) TOPICAL ONCE
Status: COMPLETED | OUTPATIENT
Start: 2022-08-14 | End: 2022-08-14

## 2022-08-14 RX ADMIN — ENOXAPARIN SODIUM 40 MG: 100 INJECTION SUBCUTANEOUS at 08:29

## 2022-08-14 RX ADMIN — SODIUM CHLORIDE, PRESERVATIVE FREE 10 ML: 5 INJECTION INTRAVENOUS at 21:45

## 2022-08-14 RX ADMIN — LISINOPRIL 10 MG: 5 TABLET ORAL at 08:59

## 2022-08-14 RX ADMIN — VANCOMYCIN HYDROCHLORIDE 1000 MG: 1 INJECTION, POWDER, LYOPHILIZED, FOR SOLUTION INTRAVENOUS at 17:00

## 2022-08-14 RX ADMIN — ENOXAPARIN SODIUM 40 MG: 100 INJECTION SUBCUTANEOUS at 21:45

## 2022-08-14 RX ADMIN — FUROSEMIDE 60 MG: 10 INJECTION, SOLUTION INTRAMUSCULAR; INTRAVENOUS at 08:29

## 2022-08-14 RX ADMIN — FUROSEMIDE 60 MG: 10 INJECTION, SOLUTION INTRAMUSCULAR; INTRAVENOUS at 15:07

## 2022-08-14 RX ADMIN — VANCOMYCIN HYDROCHLORIDE 1000 MG: 1 INJECTION, POWDER, LYOPHILIZED, FOR SOLUTION INTRAVENOUS at 10:35

## 2022-08-14 RX ADMIN — VANCOMYCIN HYDROCHLORIDE 1000 MG: 1 INJECTION, POWDER, LYOPHILIZED, FOR SOLUTION INTRAVENOUS at 02:52

## 2022-08-14 RX ADMIN — SODIUM CHLORIDE 1 G: 900 INJECTION INTRAVENOUS at 11:54

## 2022-08-14 RX ADMIN — SODIUM CHLORIDE, PRESERVATIVE FREE 10 ML: 5 INJECTION INTRAVENOUS at 05:07

## 2022-08-14 RX ADMIN — Medication 400 MG: at 16:13

## 2022-08-14 RX ADMIN — LORAZEPAM 1 MG: 1 TABLET ORAL at 18:07

## 2022-08-14 RX ADMIN — ASPIRIN 81 MG: 81 TABLET, CHEWABLE ORAL at 08:30

## 2022-08-14 RX ADMIN — FOLIC ACID 1 MG: 1 TABLET ORAL at 08:30

## 2022-08-14 RX ADMIN — Medication 1 TABLET: at 08:30

## 2022-08-14 RX ADMIN — Medication 100 MG: at 08:30

## 2022-08-14 RX ADMIN — SODIUM CHLORIDE, PRESERVATIVE FREE 10 ML: 5 INJECTION INTRAVENOUS at 15:07

## 2022-08-14 RX ADMIN — POTASSIUM CHLORIDE 20 MEQ: 20 TABLET, EXTENDED RELEASE ORAL at 08:30

## 2022-08-14 NOTE — PROGRESS NOTES
End of Shift Note    Bedside shift change report given to Celine berrios RN (oncoming nurse) by Luiz Kendall RN (offgoing nurse). Report included the following information SBAR, Kardex, and MAR    Shift worked:  nights     Shift summary and any significant changes:     -2217 Pt came up from ED via stretcher and ambulated to bed. Pt's wife is present. VSS, 2L NC, CIWA of 2. Pt complaining of cramping in chest and fingers. Dual skin completed with Jovany Burden RN. Per report from ED nurse, pt was to get a CT of abd and CTA of chest was was unable to complete it due to anxiety and hypoxia when lying flat. Both will be reschedule for the AM  -2310 Admission database complete  -0009- Pt currently sleeping, will do CIWA assessment in 2 hours per protocol  -0210 CIWA score 2, pt awake now. Will reassess in 2 hours per protocol  0425-CIWA score 2, will reassess in 2 hours  0620-CIWA score 2, pt's cramping in chest and fingers has stopped but now pt feels very hot all of a sudden and has some sweating.  Will continue to monitor and reassess in 2 hours per protocol   Concerns for physician to address:  -     Zone phone for oncoming shift:   -       Activity:  Activity Level: Bath Room Privileges, Up ad florencio  Number times ambulated in hallways past shift: 0  Number of times OOB to chair past shift: 0    Cardiac:   Cardiac Monitoring: Yes      Cardiac Rhythm: Sinus Rhythm    Access:  Current line(s): PIV     Genitourinary:   Urinary status: voiding    Respiratory:   O2 Device: Nasal cannula  Chronic home O2 use?: NO  Incentive spirometer at bedside: NO       GI:     Current diet:  ADULT DIET Regular; Low Fat/Low Chol/High Fiber/SHANTAL  Passing flatus: YES  Tolerating current diet: YES       Pain Management:   Patient states pain is manageable on current regimen: YES    Skin:     Interventions: increase time out of bed, PT/OT consult, and internal/external urinary devices    Patient Safety:  Fall Score:    Interventions: bed/chair alarm, assistive device (walker, cane, etc), gripper socks, and pt to call before getting OOB       Length of Stay:  Expected LOS: - - -  Actual LOS: 0      Raj Ling RN

## 2022-08-14 NOTE — PROGRESS NOTES
Hospitalist Progress Note    NAME: Jennifer Welch   :  1985   MRN:  [id number]       Assessment / Plan:  New onset chf, POA   Anasarca  Acute hypoxic respiratory failure  with b/l tense leg edema, abdominal wall edema, scrotal edema, elevated probnp, weight gain of 30# in 3 weeks  --due to undetected HTN; ?nephrotic syndrome since has proteinuria  Continue with lasix 60mg IV bid  --echo  Troponin mildly elevated but patient denies any chest pain  --cardiology consult noted,  TSH 2.49, D-dimer elevated  CTA of the chest ordered to rule out PE but patient has a clear picture of pulmonary edema. Unable to do CTA due to patient being very anxious  -UDS negative     Elevated blood pressures likely HTN  BP was elevated on admission, now controlled  --c/w lisinopril     Proteinuria, r/o nephrotic syndrome  --check urine protein/Cr ratio  Will consult nephrology     Bilateral leg cellulitis and abdominal wall cellulitis, POA  Sinus tachycardia  Blood Culture negative to date  --mrsa nasal swab  -c/w  rocephin and vancomycin     Excessive alcohol use  Tobacco abuse  --drinks 20-25 shots of bourbon or pints of beer a week  --monitor for withdrawal  --MVI, thiamine, folic acid. --CIWA with prn oral valium  Patient counseled about cessation, will order nicotine patch  Elevated AST  --may be from hepatic congestion from chf or alcohol. Monitor     Obesity  Body mass index is 46.02 kg/m². Code: full  DVT prophylaxis: lovenox  Surrogate decision maker:  wife Lea Cap [phone]  Updated patient wife at bedside  36 or above Morbid obesity / Body mass index is 43.28 kg/m². Estimated discharge date:   Barriers:    Code status: Full  Prophylaxis: Lovenox  Recommended Disposition: Home w/Family     Subjective:     Chief Complaint / Reason for Physician Visit  Fu new onset CHF . Discussed with RN events overnight.    Vey anxious about CTA chest   Pt still with lot of edema   Requiring 02  Review of Systems:  Symptom Y/N Comments  Symptom Y/N Comments   Fever/Chills n   Chest Pain n    Poor Appetite    Edema     Cough    Abdominal Pain n    Sputum    Joint Pain     SOB/MORENO y   Pruritis/Rash     Nausea/vomit n   Tolerating PT/OT     Diarrhea    Tolerating Diet y    Constipation    Other       Could NOT obtain due to:      Objective:     VITALS:   Last 24hrs VS reviewed since prior progress note. Most recent are:  Patient Vitals for the past 24 hrs:   Temp Pulse Resp BP SpO2   08/14/22 0734 97.5 °F (36.4 °C) 88 20 112/82 94 %   08/14/22 0213 98.8 °F (37.1 °C) 91 20 (!) 118/55 92 %   08/13/22 2217 97.7 °F (36.5 °C) 100 18 124/82 94 %   08/13/22 2200 98.1 °F (36.7 °C) 97 20 122/71 98 %   08/13/22 2005 98.8 °F (37.1 °C) 98 20 138/88 98 %   08/13/22 1645 -- (!) 104 20 137/87 97 %   08/13/22 1615 -- 96 23 (!) 139/93 97 %   08/13/22 1530 -- 94 25 121/74 90 %   08/13/22 1500 -- (!) 110 29 (!) 138/91 93 %   08/13/22 1451 -- (!) 112 27 (!) 134/107 94 %   08/13/22 1406 98.1 °F (36.7 °C) (!) 115 18 (!) 151/112 96 %       Intake/Output Summary (Last 24 hours) at 8/14/2022 5235  Last data filed at 8/14/2022 0250  Gross per 24 hour   Intake 1000 ml   Output 1840 ml   Net -840 ml        I had a face to face encounter and independently examined this patient on 8/14/2022, as outlined below:  PHYSICAL EXAM:  General: WD, WN. Alert, cooperative, no acute distress    EENT:  EOMI. Anicteric sclerae. MMM  Resp:  CTA bilaterally, no wheezing or rales. No accessory muscle use  CV:  Regular  rhythm,  b/l edema   GI:  Soft, Non distended, Non tender. +Bowel sounds  Neurologic:  Alert and oriented X 3, normal speech,   Psych:   Good insight. Not anxious nor agitated  Skin:  No rashes.   No jaundice  Abdominal cellulitis     Reviewed most current lab test results and cultures  YES  Reviewed most current radiology test results   YES  Review and summation of old records today    NO  Reviewed patient's current orders and MAR    YES  PMH/SH reviewed - no change compared to H&P  ________________________________________________________________________  Care Plan discussed with:    Comments   Patient x    Family      RN y    Care Manager     Consultant                        Multidiciplinary team rounds were held today with , nursing, pharmacist and clinical coordinator. Patient's plan of care was discussed; medications were reviewed and discharge planning was addressed. ________________________________________________________________________  Total NON critical care TIME:  40  Minutes    Total CRITICAL CARE TIME Spent:   Minutes non procedure based      Comments   >50% of visit spent in counseling and coordination of care     ________________________________________________________________________  Antonio Faustin MD     Procedures: see electronic medical records for all procedures/Xrays and details which were not copied into this note but were reviewed prior to creation of Plan. LABS:  I reviewed today's most current labs and imaging studies.   Pertinent labs include:  Recent Labs     08/14/22  0215 08/13/22  1452   WBC 8.0 6.3   HGB 14.3 14.0   HCT 45.2 44.2    231     Recent Labs     08/14/22  0215 08/13/22  1452    139   K 3.6 4.2    104   CO2 32 27   GLU 93 120*   BUN 17 17   CREA 1.08 1.06   CA 8.8 8.6   MG 1.7  --    ALB 3.1* 3.2*   TBILI 1.2* 0.8   ALT 49 52       Signed: Antonio Faustin MD

## 2022-08-14 NOTE — ED NOTES
1930 - Bedside shift change report given to [facility] (oncoming nurse) by [id number] (offgoing nurse). Report included the following information SBAR, Kardex, ED Summary, Intake/Output, and MAR.      Pt resting in bed - mild tachypnea - pt's spouse at bedside for comfort and care;; pending for CT imaging;; pending for admission bed;;

## 2022-08-14 NOTE — PROGRESS NOTES
Pharmacy Antimicrobial Kinetic Dosing    Indication for Antimicrobials: Cellulitis      Current Regimen of Each Antimicrobial:  Vancomycin 2500 mg (max) load then per pharmacy (Start Date 22; Day # 2)  Ceftriaxone 1 gm q24h start  day 2 of 7    Previous Antimicrobial Therapy:   (Start Date ; Day    Goal Level: AUC: 400-600 mg/hr/Liter/day    Date Dose & Interval Measured (mcg/mL) Predicted AUC/GARY                       Date & time of next level: 8/15    Dosing calculator used: [employer] calculator    Significant Positive Cultures:   CT of abd. - Pending       Conditions for Dosing Consideration: OBESE BMI 46.02; Wt verified with ER RN  ED notes indicates patient has gained 30 lbs in 3 weeks. Labs:  Recent Labs     22  0215 22  1452   CREA 1.08 1.06   BUN 17 17     Recent Labs     22  0215 22  1452   WBC 8.0 6.3     Temp (24hrs), Av.1 °F (36.7 °C), Min:97.4 °F (36.3 °C), Max:98.8 °F (37.1 °C)    Creatinine Clearance (mL/min):   CrCl (Ideal Body Weight): 110.8   If actual weight < IBW: CrCl (Actual Body Weight) 206.0    Impression/Plan:   Vancomycin Load =14.9 mg/kg = 2500 mg (Max dose). Maintenance dose of 1000 mg q8h predicted AUC//15.2  Vancomycin trough 8/15 prior to 2am dose  Continue Ceftriaxone 1 gm Q24H  Antimicrobial stop date Ceftriaxone 7 days; Vanc TBD      Pharmacy will follow daily and adjust medications as appropriate for renal function and/or serum levels.     Thank you,  [facility] Elysia Reed, KARISSAD    Vancomycin Dosing Document    Documents located on pharmacy Teams site: Clinical Practice -> Antimicrobial Stewardship -> Antibiotics_Vancomycin     Aminoglycoside Dosing Document    Documents located on pharmacy Teams site: Clinical Practice -> Antimicrobial Stewardship -> Antibiotics_Aminoglycosides

## 2022-08-14 NOTE — CONSULTS
[facility]  [address]  [phone]  [email or web address]  CARDIOLOGY PROGRESS NOTE    8/14/2022 11:32 AM    Admit Date: 8/13/2022    Admit Diagnosis:   Bilateral leg edema [R60.0]  Cardiomegaly [I51.7]  Acute CHF (congestive heart failure) (HCC) [I50.9]  Scrotal edema [N50.89]    Subjective: Akiko Murrell patient is a 79-year-old male with no significant past medical history, does not follow regularly with healthcare has been having progressive orthopnea and PND for several months with increasing lower extremity edema, development of scrotal edema prompted his ER visit. He denies chest pain, does admit to dyspnea with exertional activities. Patient reports EtOH at times in excess, tobacco user. Family present in the room no family history of congestive heart failure. Patient presents sitting upright in bed in no acute distress, conversive, family in room for support. Reports breathing improved with diuresis feels that he would still be unable to lay flat for CT scan at this time.     Visit Vitals  /80   Pulse 89   Temp 97.5 °F (36.4 °C)   Resp 20   Ht 6' 3\" (1.905 m)   Wt 157.1 kg (346 lb 4.8 oz)   SpO2 94%   BMI 43.28 kg/m²       Current Facility-Administered Medications   Medication Dose Route Frequency    furosemide (LASIX) injection 60 mg  60 mg IntraVENous BID    cefTRIAXone (ROCEPHIN) 1 g in 0.9% sodium chloride (MBP/ADV) 50 mL MBP  1 g IntraVENous Q24H    diphenhydrAMINE (BENADRYL) 12.5 mg/5 mL oral liquid 25 mg  25 mg Oral Q6H PRN    aspirin chewable tablet 81 mg  81 mg Oral DAILY    sodium chloride (NS) flush 5-40 mL  5-40 mL IntraVENous Q8H    sodium chloride (NS) flush 5-40 mL  5-40 mL IntraVENous PRN    acetaminophen (TYLENOL) tablet 650 mg  650 mg Oral Q6H PRN    Or    acetaminophen (TYLENOL) suppository 650 mg  650 mg Rectal Q6H PRN    polyethylene glycol (MIRALAX) packet 17 g  17 g Oral DAILY PRN ondansetron (ZOFRAN ODT) tablet 4 mg  4 mg Oral Q8H PRN    Or    ondansetron (ZOFRAN) injection 4 mg  4 mg IntraVENous Q6H PRN    enoxaparin (LOVENOX) injection 40 mg  40 mg SubCUTAneous BID    lisinopriL (PRINIVIL, ZESTRIL) tablet 10 mg  10 mg Oral DAILY    potassium chloride (K-DUR, KLOR-CON M20) SR tablet 20 mEq  20 mEq Oral DAILY    diazePAM (VALIUM) tablet 10 mg  10 mg Oral Q1H PRN    diazePAM (VALIUM) tablet 20 mg  20 mg Oral A5W PRN    folic acid (FOLVITE) tablet 1 mg  1 mg Oral DAILY    thiamine mononitrate (B-1) tablet 100 mg  100 mg Oral DAILY    multivitamin, tx-iron-ca-min (THERA-M w/ IRON) tablet 1 Tablet  1 Tablet Oral DAILY    vancomycin (VANCOCIN) 1,000 mg in 0.9% sodium chloride 250 mL ([id number])  1,000 mg IntraVENous Q8H         Objective:      Physical Exam    General: Well developed, in no acute distress, cooperative and alert  HEENT: No carotid bruits, no JVD, trach is midline. Neck Supple, PERRL, EOM intact. Heart:  Normal S1/S2 negative S3 or S4. Regular, no murmur, gallop or rub. Respiratory: diminished at bases, no rales   Abdomen:   Soft, non-tender, no masses, bowel sounds are active. Extremities:  3+ pedal LE edema , normal cap refill, no cyanosis, atraumatic. Neuro: A&Ox3, speech clear, gait stable. Skin: Skin color is normal. . Non diaphoretic  Vascular: 2+ pulses symmetric in all extremities      Data Review:   Recent Labs     08/14/22  0215 08/13/22  1452   WBC 8.0 6.3   HGB 14.3 14.0   HCT 45.2 44.2    231     Recent Labs     08/14/22  0215 08/13/22  1452    139   K 3.6 4.2    104   CO2 32 27   GLU 93 120*   BUN 17 17   CREA 1.08 1.06   CA 8.8 8.6   MG 1.7  --    ALB 3.1* 3.2*   TBILI 1.2* 0.8   ALT 49 52       No results for input(s): TROIQ, CPK, CKMB in the last 72 hours.       Intake/Output Summary (Last 24 hours) at 8/14/2022 1132  Last data filed at 8/14/2022 1035  Gross per 24 hour   Intake 1000 ml   Output 4060 ml   Net -3060 ml        Telemetry: NSR EKG: NSR/Sinus tach   Cxray:Cardiomegaly with mild vascular prominence and elevated left  hemidiaphragm. Assessment:     Active Problems:    Cardiomegaly (8/13/2022)      Scrotal edema (8/13/2022)      Bilateral leg edema (8/13/2022)      Acute CHF (congestive heart failure) ([address] (8/13/2022)        Plan:     1. Congestive heart failure: Anasarca, proBNP 4509, 3 L output since IV diuresis breathing improved. Continue IV diuresis. Echo pending. 2.  Excessive alcohol intake: Counseled patient on detrimental effects of alcohol now especially in the setting of congestive heart failure. .  New onset congestive heart failure symptoms present for several months. Continue diuresis awaiting echocardiogram, if low ejection fraction will need ischemic work-up.     Will continue to follow      Marvin Hardy NP

## 2022-08-14 NOTE — PROGRESS NOTES
0700: Bedside shift change report given to Raquel Allen, RN and Amparo Flores, RN (oncoming nurse) by Maryann Goode RN (offgoing nurse). Report included the following information SBAR and Kardex. 1312: Notified Dr. Ruthie Eid of pt's elevated D-dimer. Will continue with CTA if pt can tolerate laying flat and give ativan prior to CTA per Dr. Ruthie Eid    [id number]: Pt had 5 beats of vtach. Pt asymptomatic and was urinating at the time. Notified Dr. Ruthie Eid and will replete Mag. Will continue to monitor. VSS. 1715: Patient continues to diurese well and has urinated more than 5000ml during this shift. Edema has visibly improved in abdomen, face and lower extremities. Pt will attempt to go to CT.

## 2022-08-15 LAB
ANION GAP SERPL CALC-SCNC: 5 MMOL/L (ref 5–15)
ATRIAL RATE: 94 BPM
BUN SERPL-MCNC: 14 MG/DL (ref 6–20)
BUN/CREAT SERPL: 15 (ref 12–20)
CALCIUM SERPL-MCNC: 9 MG/DL (ref 8.5–10.1)
CALCULATED P AXIS, ECG09: 57 DEGREES
CALCULATED R AXIS, ECG10: 32 DEGREES
CALCULATED T AXIS, ECG11: 129 DEGREES
CHLORIDE SERPL-SCNC: 96 MMOL/L (ref 97–108)
CO2 SERPL-SCNC: 38 MMOL/L (ref 21–32)
CREAT SERPL-MCNC: 0.95 MG/DL (ref 0.7–1.3)
DATE LAST DOSE: ABNORMAL
DIAGNOSIS, 93000: NORMAL
ERYTHROCYTE [DISTWIDTH] IN BLOOD BY AUTOMATED COUNT: 14.6 % (ref 11.5–14.5)
EST. AVERAGE GLUCOSE BLD GHB EST-MCNC: 131 MG/DL
GLUCOSE SERPL-MCNC: 119 MG/DL (ref 65–100)
HBA1C MFR BLD: 6.2 % (ref 4–5.6)
HCT VFR BLD AUTO: 47.1 % (ref 36.6–50.3)
HGB BLD-MCNC: 14.2 G/DL (ref 12.1–17)
MAGNESIUM SERPL-MCNC: 2 MG/DL (ref 1.6–2.4)
MCH RBC QN AUTO: 27.2 PG (ref 26–34)
MCHC RBC AUTO-ENTMCNC: 30.1 G/DL (ref 30–36.5)
MCV RBC AUTO: 90.1 FL (ref 80–99)
NRBC # BLD: 0 K/UL (ref 0–0.01)
NRBC BLD-RTO: 0 PER 100 WBC
P-R INTERVAL, ECG05: 158 MS
PLATELET # BLD AUTO: 213 K/UL (ref 150–400)
PMV BLD AUTO: 10.9 FL (ref 8.9–12.9)
POTASSIUM SERPL-SCNC: 3.6 MMOL/L (ref 3.5–5.1)
Q-T INTERVAL, ECG07: 376 MS
QRS DURATION, ECG06: 102 MS
QTC CALCULATION (BEZET), ECG08: 470 MS
RBC # BLD AUTO: 5.23 M/UL (ref 4.1–5.7)
REPORTED DOSE,DOSE: ABNORMAL UNITS
REPORTED DOSE/TIME,TMG: ABNORMAL
SODIUM SERPL-SCNC: 139 MMOL/L (ref 136–145)
TROPONIN-HIGH SENSITIVITY: 101 NG/L (ref 0–76)
VANCOMYCIN TROUGH SERPL-MCNC: 11.9 UG/ML (ref 5–10)
VENTRICULAR RATE, ECG03: 94 BPM
WBC # BLD AUTO: 7.6 K/UL (ref 4.1–11.1)

## 2022-08-15 PROCEDURE — 36415 COLL VENOUS BLD VENIPUNCTURE: CPT

## 2022-08-15 PROCEDURE — 74011250636 HC RX REV CODE- 250/636: Performed by: HOSPITALIST

## 2022-08-15 PROCEDURE — 74011250637 HC RX REV CODE- 250/637: Performed by: NURSE PRACTITIONER

## 2022-08-15 PROCEDURE — 83036 HEMOGLOBIN GLYCOSYLATED A1C: CPT

## 2022-08-15 PROCEDURE — 80048 BASIC METABOLIC PNL TOTAL CA: CPT

## 2022-08-15 PROCEDURE — 74011250637 HC RX REV CODE- 250/637: Performed by: HOSPITALIST

## 2022-08-15 PROCEDURE — 74011000258 HC RX REV CODE- 258: Performed by: HOSPITALIST

## 2022-08-15 PROCEDURE — 84484 ASSAY OF TROPONIN QUANT: CPT

## 2022-08-15 PROCEDURE — 74011000250 HC RX REV CODE- 250: Performed by: HOSPITALIST

## 2022-08-15 PROCEDURE — 85027 COMPLETE CBC AUTOMATED: CPT

## 2022-08-15 PROCEDURE — 93005 ELECTROCARDIOGRAM TRACING: CPT

## 2022-08-15 PROCEDURE — 77010033678 HC OXYGEN DAILY

## 2022-08-15 PROCEDURE — 83735 ASSAY OF MAGNESIUM: CPT

## 2022-08-15 PROCEDURE — 65270000046 HC RM TELEMETRY

## 2022-08-15 PROCEDURE — 80202 ASSAY OF VANCOMYCIN: CPT

## 2022-08-15 PROCEDURE — 74011250637 HC RX REV CODE- 250/637: Performed by: INTERNAL MEDICINE

## 2022-08-15 RX ORDER — POTASSIUM CHLORIDE 20 MEQ/1
40 TABLET, EXTENDED RELEASE ORAL
Status: COMPLETED | OUTPATIENT
Start: 2022-08-15 | End: 2022-08-15

## 2022-08-15 RX ORDER — LORAZEPAM 2 MG/ML
2 INJECTION INTRAMUSCULAR
Status: COMPLETED | OUTPATIENT
Start: 2022-08-15 | End: 2022-08-16

## 2022-08-15 RX ORDER — METOPROLOL SUCCINATE 25 MG/1
25 TABLET, EXTENDED RELEASE ORAL DAILY
Status: DISCONTINUED | OUTPATIENT
Start: 2022-08-15 | End: 2022-08-16

## 2022-08-15 RX ORDER — POTASSIUM CHLORIDE 20 MEQ/1
40 TABLET, EXTENDED RELEASE ORAL DAILY
Status: DISCONTINUED | OUTPATIENT
Start: 2022-08-16 | End: 2022-08-19 | Stop reason: HOSPADM

## 2022-08-15 RX ADMIN — FOLIC ACID 1 MG: 1 TABLET ORAL at 09:18

## 2022-08-15 RX ADMIN — SODIUM CHLORIDE, PRESERVATIVE FREE 10 ML: 5 INJECTION INTRAVENOUS at 05:49

## 2022-08-15 RX ADMIN — VANCOMYCIN HYDROCHLORIDE 1000 MG: 1 INJECTION, POWDER, LYOPHILIZED, FOR SOLUTION INTRAVENOUS at 02:20

## 2022-08-15 RX ADMIN — SODIUM CHLORIDE 1 G: 900 INJECTION INTRAVENOUS at 11:52

## 2022-08-15 RX ADMIN — ENOXAPARIN SODIUM 40 MG: 100 INJECTION SUBCUTANEOUS at 21:58

## 2022-08-15 RX ADMIN — ENOXAPARIN SODIUM 40 MG: 100 INJECTION SUBCUTANEOUS at 09:18

## 2022-08-15 RX ADMIN — FUROSEMIDE 60 MG: 10 INJECTION, SOLUTION INTRAMUSCULAR; INTRAVENOUS at 09:18

## 2022-08-15 RX ADMIN — Medication 100 MG: at 09:18

## 2022-08-15 RX ADMIN — VANCOMYCIN HYDROCHLORIDE 1000 MG: 1 INJECTION, POWDER, LYOPHILIZED, FOR SOLUTION INTRAVENOUS at 09:18

## 2022-08-15 RX ADMIN — Medication 1 TABLET: at 09:18

## 2022-08-15 RX ADMIN — LISINOPRIL 10 MG: 5 TABLET ORAL at 09:18

## 2022-08-15 RX ADMIN — VANCOMYCIN HYDROCHLORIDE 1000 MG: 1 INJECTION, POWDER, LYOPHILIZED, FOR SOLUTION INTRAVENOUS at 17:16

## 2022-08-15 RX ADMIN — FUROSEMIDE 60 MG: 10 INJECTION, SOLUTION INTRAMUSCULAR; INTRAVENOUS at 17:16

## 2022-08-15 RX ADMIN — SODIUM CHLORIDE, PRESERVATIVE FREE 10 ML: 5 INJECTION INTRAVENOUS at 21:59

## 2022-08-15 RX ADMIN — ASPIRIN 81 MG: 81 TABLET, CHEWABLE ORAL at 09:18

## 2022-08-15 RX ADMIN — POTASSIUM CHLORIDE 40 MEQ: 20 TABLET, EXTENDED RELEASE ORAL at 10:30

## 2022-08-15 RX ADMIN — POTASSIUM CHLORIDE 20 MEQ: 20 TABLET, EXTENDED RELEASE ORAL at 09:18

## 2022-08-15 RX ADMIN — METOPROLOL SUCCINATE 25 MG: 25 TABLET, FILM COATED, EXTENDED RELEASE ORAL at 10:30

## 2022-08-15 RX ADMIN — SODIUM CHLORIDE, PRESERVATIVE FREE 10 ML: 5 INJECTION INTRAVENOUS at 17:16

## 2022-08-15 NOTE — PROGRESS NOTES
0700: Bedside shift change report given to Judith Goldstein (oncoming nurse) by Bijan Sandoval (offgoing nurse). Report included the following information SBAR and Kardex. End of Shift Note    Bedside shift change report given to [facility] RN (oncoming nurse) by Emeterio Swartz (offgoing nurse). Report included the following information SBAR and Kardex    Shift worked:  day     Shift summary and any significant changes:     Diuresing well on 60 lasix BID;   -5440 fluid balance  Unable to wean O2 from 2L    NSVT/frequent PVCs; Mag 2.0  K 3.6 added 40meq daily  Toprol 25mg XL added    Pt reports able to lay flat longer;  PRN IV Ativan 2mg ordered for CT; Will coordinate RN assisted sedation with CT for scan tonight or tomorrow morning     Concerns for physician to address:       Zone phone for oncoming shift:          Activity:  Activity Level: Up ad florencio  Number times ambulated in hallways past shift: 0  Number of times OOB to chair past shift: 1    Cardiac:   Cardiac Monitoring: Yes      Cardiac Rhythm: Sinus Rhythm, Sinus Tachy, PVC    Access:  Current line(s): PIV     Genitourinary:   Urinary status: voiding    Respiratory:   O2 Device: Nasal cannula  Chronic home O2 use?: NO  Incentive spirometer at bedside: YES       GI:  Last Bowel Movement Date: 08/13/22  Current diet:  ADULT DIET Regular; Low Fat/Low Chol/High Fiber/SHANTAL  Passing flatus: YES  Tolerating current diet: YES       Pain Management:   Patient states pain is manageable on current regimen: YES    Skin:  Jean Paul Score: 21  Interventions: increase time out of bed    Patient Safety:  Fall Score:  Total Score: 1  Interventions: gripper socks       Length of Stay:  Expected LOS: 3d 0h  Actual LOS: 2      Emeterio Swartz

## 2022-08-15 NOTE — PROGRESS NOTES
Pharmacy Antimicrobial Kinetic Dosing    Indication for Antimicrobials: Cellulitis      Current Regimen of Each Antimicrobial:  Vancomycin 1000 mg IV q8h (Start Date ; Day 3)  Ceftriaxone 1 g IV q24h (Start Date ; Day 3)    Previous Antimicrobial Therapy:    Goal Level: AUC: 400-600 mg/hr/Liter/day    Date Dose & Interval Measured (mcg/mL) Predicted AUC/GARY   8/15 1000 mg q8h 11.9 426                 Date & time of next level:     Dosing calculator used: [employer] calculator    Significant Positive Cultures:    blood: NGTD      Conditions for Dosing Consideration: OBESE BMI 46.02; Wt verified with ER RN  ED notes indicates patient has gained 30 lbs in 3 weeks. Labs:  Recent Labs     08/15/22  0204 22  0215 22  1452   CREA 0.95 1.08 1.06   BUN 14 17 17     Recent Labs     08/15/22  0204 22  0215 22  1452   WBC 7.6 8.0 6.3     Temp (24hrs), Av.7 °F (36.5 °C), Min:97 °F (36.1 °C), Max:98.1 °F (36.7 °C)    Creatinine Clearance (mL/min):   CrCl (Ideal Body Weight): 126.0   If actual weight < IBW: CrCl (Actual Body Weight) 228.0    Impression/Plan:   Vancomycin trough of 11.9 is therapeutic. Continu vancomycin 1000 mg q8h  Will re-measure trough in a couple of days  Continue Ceftriaxone 1 gm Q24H  Antimicrobial stop date Ceftriaxone 7 days; Vanc TBD      Pharmacy will follow daily and adjust medications as appropriate for renal function and/or serum levels.     Thank you,  Carmen Webster, PHARMD    Vancomycin Dosing Document    Documents located on pharmacy Teams site: Clinical Practice -> Antimicrobial Stewardship -> Antibiotics_Vancomycin     Aminoglycoside Dosing Document    Documents located on pharmacy Teams site: Clinical Practice -> Antimicrobial Stewardship -> Antibiotics_Aminoglycosides

## 2022-08-15 NOTE — PROGRESS NOTES
[facility]  [address] [facility], [address] [place] [address]  [phone]  [email or web address]  CARDIOLOGY PROGRESS NOTE    8/15/2022 8:19 AM    Admit Date: 8/13/2022    Admit Diagnosis:   Bilateral leg edema [R60.0]  Cardiomegaly [I51.7]  Acute CHF (congestive heart failure) (HCC) [I50.9]  Scrotal edema [N50.89]    Subjective: Sylwia Lopez reports feeling will this am, presenting sleep in bed at 30degree elevation.      Visit Vitals  BP (!) 128/91   Pulse 94   Temp 98.1 °F (36.7 °C)   Resp 15   Ht 6' 3\" (1.905 m)   Wt 152.9 kg (337 lb 1.3 oz)   SpO2 93%   BMI 42.13 kg/m²       Current Facility-Administered Medications   Medication Dose Route Frequency    nicotine (NICODERM CQ) 14 mg/24 hr patch 1 Patch  1 Patch TransDERmal DAILY    furosemide (LASIX) injection 60 mg  60 mg IntraVENous BID    cefTRIAXone (ROCEPHIN) 1 g in 0.9% sodium chloride (MBP/ADV) 50 mL MBP  1 g IntraVENous Q24H    diphenhydrAMINE (BENADRYL) 12.5 mg/5 mL oral liquid 25 mg  25 mg Oral Q6H PRN    aspirin chewable tablet 81 mg  81 mg Oral DAILY    sodium chloride (NS) flush 5-40 mL  5-40 mL IntraVENous Q8H    sodium chloride (NS) flush 5-40 mL  5-40 mL IntraVENous PRN    acetaminophen (TYLENOL) tablet 650 mg  650 mg Oral Q6H PRN    Or    acetaminophen (TYLENOL) suppository 650 mg  650 mg Rectal Q6H PRN    polyethylene glycol (MIRALAX) packet 17 g  17 g Oral DAILY PRN    ondansetron (ZOFRAN ODT) tablet 4 mg  4 mg Oral Q8H PRN    Or    ondansetron (ZOFRAN) injection 4 mg  4 mg IntraVENous Q6H PRN    enoxaparin (LOVENOX) injection 40 mg  40 mg SubCUTAneous BID    lisinopriL (PRINIVIL, ZESTRIL) tablet 10 mg  10 mg Oral DAILY    potassium chloride (K-DUR, KLOR-CON M20) SR tablet 20 mEq  20 mEq Oral DAILY    diazePAM (VALIUM) tablet 10 mg  10 mg Oral Q1H PRN    diazePAM (VALIUM) tablet 20 mg  20 mg Oral V3W PRN    folic acid (FOLVITE) tablet 1 mg  1 mg Oral DAILY    thiamine mononitrate (B-1) tablet 100 mg 100 mg Oral DAILY    multivitamin, tx-iron-ca-min (THERA-M w/ IRON) tablet 1 Tablet  1 Tablet Oral DAILY    vancomycin (VANCOCIN) 1,000 mg in 0.9% sodium chloride 250 mL ([id number])  1,000 mg IntraVENous Q8H         Objective:      Physical Exam    General: Well developed, in no acute distress, cooperative and alert  HEENT: No carotid bruits, no JVD, trach is midline. Neck Supple, PERRL, EOM intact. Heart:  Normal S1/S2 negative S3 or S4. Regular, no murmur, gallop or rub. Respiratory: Clear bilaterally x 4, no wheezing or rales  Abdomen:   Soft, non-tender, no masses, bowel sounds are active. Extremities: 2+ pitting LE edema . Neuro: A&Ox3, speech clear,       Data Review:   Recent Labs     08/15/22  0204 08/14/22  0215 08/13/22  1452   WBC 7.6 8.0 6.3   HGB 14.2 14.3 14.0   HCT 47.1 45.2 44.2    220 231     Recent Labs     08/15/22  0204 08/14/22  0215 08/13/22  1452    137 139   K 3.6 3.6 4.2   CL 96* 100 104   CO2 38* 32 27   * 93 120*   BUN 14 17 17   CREA 0.95 1.08 1.06   CA 9.0 8.8 8.6   MG  --  1.7  --    ALB  --  3.1* 3.2*   TBILI  --  1.2* 0.8   ALT  --  49 52       No results for input(s): TROIQ, CPK, CKMB in the last 72 hours. Intake/Output Summary (Last 24 hours) at 8/15/2022 0819  Last data filed at 8/15/2022 0740  Gross per 24 hour   Intake 1680 ml   Output 6960 ml   Net -5280 ml        Telemetry: NSR  EKG:  Cxray:    Assessment:     Active Problems:    Cardiomegaly (8/13/2022)      Scrotal edema (8/13/2022)      Bilateral leg edema (8/13/2022)      Acute CHF (congestive heart failure) ([address] (8/13/2022)        Plan:     CHF: ECHO pending, pt down 7liters, creatinine 0.95 Continue diuresis. Condition improving. Addendum 10: 15 hours, advised by RN staff patient has been having NSVT/frequent PVCs. Normal mag, potassium 3.6, ordered 40 mEq p. o. X 1 K Dur, increased standard dosage of potassium to 40 mEq daily. Blood pressure stable started metoprolol XL 25 mg. Awaiting echo results.   Zachariah Olivares, NP

## 2022-08-15 NOTE — PROGRESS NOTES
End of Shift Note    Bedside shift change report given to Chandana Boyd RN (oncoming nurse) by Kristy Lopez RN (offgoing nurse). Report included the following information SBAR, Kardex, ED Summary, Intake/Output, MAR, and Recent Results    Shift worked:  night     Shift summary and any significant changes:     Pt rested comfortably, echo completed, pt should attempt CT again today     Nephrology consult to be called today     Concerns for physician to address:  none     Zone phone for oncoming shift:   xxx       Activity:  Activity Level: Up ad florencio  Number times ambulated in hallways past shift: 0  Number of times OOB to chair past shift: 0    Cardiac:   Cardiac Monitoring: Yes      Cardiac Rhythm: Sinus Rhythm    Access:  Current line(s): PIV     Genitourinary:   Urinary status: voiding    Respiratory:   O2 Device: Nasal cannula  Chronic home O2 use?: NO  Incentive spirometer at bedside: NO       GI:  Last Bowel Movement Date: 08/13/22  Current diet:  ADULT DIET Regular; Low Fat/Low Chol/High Fiber/SHANTAL  Passing flatus: YES  Tolerating current diet: YES       Pain Management:   Patient states pain is manageable on current regimen: YES    Skin:  Jean Paul Score: 21  Interventions: increase time out of bed    Patient Safety:  Fall Score:  Total Score: 1  Interventions: gripper socks and pt to call before getting OOB       Length of Stay:  Expected LOS: - - -  Actual LOS: 2      Kristy Lopez RN

## 2022-08-15 NOTE — CONSULTS
Came to see the patient. Patient's creatinine is normal.  He had some proteinuria on UA but his UPCR is normal.  Advised Dr. Ruthie Eid to cancel the consult. We will be happy to see the patient if anything changes.

## 2022-08-15 NOTE — PROGRESS NOTES
Problem: Falls - Risk of  Goal: *Absence of Falls  Description: Document Colvin Reason Fall Risk and appropriate interventions in the flowsheet.   Outcome: Progressing Towards Goal  Note: Fall Risk Interventions:            Medication Interventions: Assess postural VS orthostatic hypotension, Bed/chair exit alarm, Utilize gait belt for transfers/ambulation, Teach patient to arise slowly, Patient to call before getting OOB         History of Falls Interventions: Bed/chair exit alarm, Utilize gait belt for transfer/ambulation, Assess for delayed presentation/identification of injury for 48 hrs (comment for end date)         Problem: General Medical Care Plan  Goal: *Vital signs within specified parameters  Outcome: Progressing Towards Goal  Goal: *Labs within defined limits  Outcome: Progressing Towards Goal  Goal: *Absence of infection signs and symptoms  Outcome: Progressing Towards Goal  Goal: *Optimal pain control at patient's stated goal  Outcome: Progressing Towards Goal  Goal: *Skin integrity maintained  Outcome: Progressing Towards Goal  Goal: *Fluid volume balance  Outcome: Progressing Towards Goal  Goal: *Optimize nutritional status  Outcome: Progressing Towards Goal  Goal: *Anxiety reduced or absent  Outcome: Progressing Towards Goal  Goal: *Progressive mobility and function (eg: ADL's)  Outcome: Progressing Towards Goal

## 2022-08-15 NOTE — PROGRESS NOTES
Hospitalist Progress Note    NAME: Isael Nj   :  1985   MRN:  [id number]       Assessment / Plan:  New onset chf, POA   Anasarca  Acute hypoxic respiratory failure  with b/l tense leg edema, abdominal wall edema, scrotal edema, elevated probnp, weight gain of 30# in 3 weeks  --due to undetected HTN; ?nephrotic syndrome since has proteinuria  Continue with lasix 60mg IV bid  --echo  Troponin mildly elevated but patient denies any chest pain  --cardiology consult noted,  TSH 2.49, D-dimer elevated  CTA of the chest ordered to rule out PE but patient has a clear picture of pulmonary edema. Unable to do CTA due to patient being very anxious  Also have CT abdomen and pelvis ordered for scrotal swelling. Patient will need to do a CAT scan on the sedation  -UDS negative     Elevated blood pressures likely HTN  NSVT  BP was elevated on admission, now controlled  --c/w lisinopril  Mag was low yesterday, given p.o. mag 400 mg  Repeat labs showed normal magnesium  Started on beta-blocker by cards  Trending up from 85 to 101, follow-up 2D echo, if EF low will need ischemic work-up     Proteinuria, r/o nephrotic syndrome  Patient creatinine normal, urine protein creatinine ratio is normal  Bilateral leg cellulitis and abdominal wall cellulitis, POA  Sinus tachycardia  Blood Culture negative to date  --mrsa nasal swab  -c/w  rocephin and vancomycin  Lower extremity Doppler negative for DVT  Excessive alcohol use  Tobacco abuse  --drinks 20-25 shots of bourbon or pints of beer a week  --monitor for withdrawal  --MVI, thiamine, folic acid. --CIWA with prn oral valium  Patient counseled about cessation, will order nicotine patch  Elevated AST  --may be from hepatic congestion from chf or alcohol. Monitor     Obesity  Body mass index is 46.02 kg/m².      Code: full  DVT prophylaxis: lovenox  Surrogate decision maker:  wife Karis Hudson [phone]  Updated patient wife at bedside  36 or above Morbid obesity / Body mass index is 42.13 kg/m². Estimated discharge date: August 17  Barriers:    Code status: Full  Prophylaxis: Lovenox  Recommended Disposition: Home w/Family     Subjective:     Chief Complaint / Reason for Physician Visit  Fu new onset CHF . Discussed with RN events overnight. Patient still have persistent edema but overall feels better  Review of Systems:  Symptom Y/N Comments  Symptom Y/N Comments   Fever/Chills n   Chest Pain n    Poor Appetite    Edema     Cough    Abdominal Pain n    Sputum    Joint Pain     SOB/MORENO y   Pruritis/Rash     Nausea/vomit n   Tolerating PT/OT     Diarrhea    Tolerating Diet y    Constipation    Other       Could NOT obtain due to:      Objective:     VITALS:   Last 24hrs VS reviewed since prior progress note. Most recent are:  Patient Vitals for the past 24 hrs:   Temp Pulse Resp BP SpO2   08/15/22 0740 98.1 °F (36.7 °C) 94 15 (!) 128/91 93 %   08/15/22 0225 97.8 °F (36.6 °C) 99 16 126/79 91 %   08/14/22 2149 98.1 °F (36.7 °C) (!) 102 18 (!) 144/82 92 %   08/14/22 1900 98 °F (36.7 °C) 94 16 (!) 129/98 94 %   08/14/22 1507 97 °F (36.1 °C) 94 18 117/74 96 %   08/14/22 1031 97.4 °F (36.3 °C) 96 18 135/86 94 %         Intake/Output Summary (Last 24 hours) at 8/15/2022 0840  Last data filed at 8/15/2022 0740  Gross per 24 hour   Intake 1680 ml   Output 6960 ml   Net -5280 ml          I had a face to face encounter and independently examined this patient on 8/15/2022, as outlined below:  PHYSICAL EXAM:  General: WD, WN. Alert, cooperative, no acute distress    EENT:  EOMI. Anicteric sclerae. MMM  Resp:  CTA bilaterally, no wheezing or rales. No accessory muscle use  CV:  Regular  rhythm,  b/l edema   GI:  Soft, Non distended, Non tender. +Bowel sounds  Neurologic:  Alert and oriented X 3, normal speech,   Psych:   Good insight. Not anxious nor agitated  Skin:  No rashes.   No jaundice  Abdominal cellulitis     Reviewed most current lab test results and cultures  YES  Reviewed most current radiology test results   YES  Review and summation of old records today    NO  Reviewed patient's current orders and MAR    YES  PMH/SH reviewed - no change compared to H&P  ________________________________________________________________________  Care Plan discussed with:    Comments   Patient x    Family      RN y    Care Manager     Consultant                        Multidiciplinary team rounds were held today with , nursing, pharmacist and clinical coordinator. Patient's plan of care was discussed; medications were reviewed and discharge planning was addressed. ________________________________________________________________________  Total NON critical care TIME:  40  Minutes    Total CRITICAL CARE TIME Spent:   Minutes non procedure based      Comments   >50% of visit spent in counseling and coordination of care     ________________________________________________________________________  Brown Danielson MD     Procedures: see electronic medical records for all procedures/Xrays and details which were not copied into this note but were reviewed prior to creation of Plan. LABS:  I reviewed today's most current labs and imaging studies.   Pertinent labs include:  Recent Labs     08/15/22  0204 08/14/22  0215 08/13/22  1452   WBC 7.6 8.0 6.3   HGB 14.2 14.3 14.0   HCT 47.1 45.2 44.2    220 231       Recent Labs     08/15/22  0204 08/14/22  0215 08/13/22  1452    137 139   K 3.6 3.6 4.2   CL 96* 100 104   CO2 38* 32 27   * 93 120*   BUN 14 17 17   CREA 0.95 1.08 1.06   CA 9.0 8.8 8.6   MG  --  1.7  --    ALB  --  3.1* 3.2*   TBILI  --  1.2* 0.8   ALT  --  49 52         Signed: Brown Danielson MD

## 2022-08-16 ENCOUNTER — APPOINTMENT (OUTPATIENT)
Dept: NON INVASIVE DIAGNOSTICS | Age: 37
DRG: 291 | End: 2022-08-16
Attending: HOSPITALIST
Payer: COMMERCIAL

## 2022-08-16 ENCOUNTER — APPOINTMENT (OUTPATIENT)
Dept: CT IMAGING | Age: 37
DRG: 291 | End: 2022-08-16
Attending: EMERGENCY MEDICINE
Payer: COMMERCIAL

## 2022-08-16 LAB
ANION GAP SERPL CALC-SCNC: 2 MMOL/L (ref 5–15)
BUN SERPL-MCNC: 17 MG/DL (ref 6–20)
BUN/CREAT SERPL: 18 (ref 12–20)
CALCIUM SERPL-MCNC: 9.5 MG/DL (ref 8.5–10.1)
CHLORIDE SERPL-SCNC: 93 MMOL/L (ref 97–108)
CO2 SERPL-SCNC: 41 MMOL/L (ref 21–32)
CREAT SERPL-MCNC: 0.93 MG/DL (ref 0.7–1.3)
ECHO AV AREA PEAK VELOCITY: 2.6 CM2
ECHO AV AREA VTI: 3.3 CM2
ECHO AV AREA/BSA PEAK VELOCITY: 1 CM2/M2
ECHO AV AREA/BSA VTI: 1.2 CM2/M2
ECHO AV MEAN GRADIENT: 5 MMHG
ECHO AV MEAN VELOCITY: 1.1 M/S
ECHO AV PEAK GRADIENT: 8 MMHG
ECHO AV PEAK VELOCITY: 1.4 M/S
ECHO AV VELOCITY RATIO: 0.5
ECHO AV VTI: 21.8 CM
ECHO EST RA PRESSURE: 10 MMHG
ECHO LA DIAMETER INDEX: 1.71 CM/M2
ECHO LA DIAMETER: 4.6 CM
ECHO LA VOL 4C: 76 ML (ref 18–58)
ECHO LA VOLUME INDEX A4C: 28 ML/M2 (ref 16–34)
ECHO LV E' LATERAL VELOCITY: 6 CM/S
ECHO LV E' SEPTAL VELOCITY: 6 CM/S
ECHO LV FRACTIONAL SHORTENING: 15 % (ref 28–44)
ECHO LV INTERNAL DIMENSION DIASTOLE INDEX: 2.42 CM/M2
ECHO LV INTERNAL DIMENSION DIASTOLIC: 6.5 CM (ref 4.2–5.9)
ECHO LV INTERNAL DIMENSION SYSTOLIC INDEX: 2.04 CM/M2
ECHO LV INTERNAL DIMENSION SYSTOLIC: 5.5 CM
ECHO LV IVSD: 1.2 CM (ref 0.6–1)
ECHO LV MASS 2D: 358.6 G (ref 88–224)
ECHO LV MASS INDEX 2D: 133.3 G/M2 (ref 49–115)
ECHO LV POSTERIOR WALL DIASTOLIC: 1.2 CM (ref 0.6–1)
ECHO LV RELATIVE WALL THICKNESS RATIO: 0.37
ECHO LVOT AREA: 4.9 CM2
ECHO LVOT AV VTI INDEX: 0.65
ECHO LVOT DIAM: 2.5 CM
ECHO LVOT MEAN GRADIENT: 1 MMHG
ECHO LVOT PEAK GRADIENT: 2 MMHG
ECHO LVOT PEAK VELOCITY: 0.7 M/S
ECHO LVOT STROKE VOLUME INDEX: 25.9 ML/M2
ECHO LVOT SV: 69.7 ML
ECHO LVOT VTI: 14.2 CM
ECHO MV A VELOCITY: 0.66 M/S
ECHO MV AREA PHT: 4.9 CM2
ECHO MV AREA VTI: 2.9 CM2
ECHO MV E DECELERATION TIME (DT): 145.8 MS
ECHO MV E VELOCITY: 1.18 M/S
ECHO MV E/A RATIO: 1.79
ECHO MV E/E' LATERAL: 19.67
ECHO MV E/E' RATIO (AVERAGED): 19.67
ECHO MV E/E' SEPTAL: 19.67
ECHO MV LVOT VTI INDEX: 1.68
ECHO MV MAX VELOCITY: 1.1 M/S
ECHO MV MEAN GRADIENT: 4 MMHG
ECHO MV MEAN VELOCITY: 0.9 M/S
ECHO MV PEAK GRADIENT: 5 MMHG
ECHO MV PRESSURE HALF TIME (PHT): 45 MS
ECHO MV REGURGITANT PEAK GRADIENT: 88 MMHG
ECHO MV REGURGITANT PEAK VELOCITY: 4.7 M/S
ECHO MV VTI: 23.9 CM
ECHO PV MAX VELOCITY: 1.1 M/S
ECHO PV PEAK GRADIENT: 5 MMHG
ECHO RIGHT VENTRICULAR SYSTOLIC PRESSURE (RVSP): 45 MMHG
ECHO RV FREE WALL PEAK S': 13 CM/S
ECHO RV TAPSE: 1.5 CM (ref 1.7–?)
ECHO TV REGURGITANT MAX VELOCITY: 2.94 M/S
ECHO TV REGURGITANT PEAK GRADIENT: 34 MMHG
GLUCOSE SERPL-MCNC: 112 MG/DL (ref 65–100)
POTASSIUM SERPL-SCNC: 3.7 MMOL/L (ref 3.5–5.1)
SODIUM SERPL-SCNC: 136 MMOL/L (ref 136–145)

## 2022-08-16 PROCEDURE — 74011250637 HC RX REV CODE- 250/637: Performed by: HOSPITALIST

## 2022-08-16 PROCEDURE — 74011250636 HC RX REV CODE- 250/636: Performed by: NURSE PRACTITIONER

## 2022-08-16 PROCEDURE — 74011250636 HC RX REV CODE- 250/636: Performed by: HOSPITALIST

## 2022-08-16 PROCEDURE — 65270000046 HC RM TELEMETRY

## 2022-08-16 PROCEDURE — 74011250637 HC RX REV CODE- 250/637: Performed by: INTERNAL MEDICINE

## 2022-08-16 PROCEDURE — 80048 BASIC METABOLIC PNL TOTAL CA: CPT

## 2022-08-16 PROCEDURE — 74177 CT ABD & PELVIS W/CONTRAST: CPT

## 2022-08-16 PROCEDURE — 74011000636 HC RX REV CODE- 636: Performed by: GENERAL ACUTE CARE HOSPITAL

## 2022-08-16 PROCEDURE — 93306 TTE W/DOPPLER COMPLETE: CPT | Performed by: INTERNAL MEDICINE

## 2022-08-16 PROCEDURE — 74011000250 HC RX REV CODE- 250: Performed by: HOSPITALIST

## 2022-08-16 PROCEDURE — 36415 COLL VENOUS BLD VENIPUNCTURE: CPT

## 2022-08-16 PROCEDURE — 71275 CT ANGIOGRAPHY CHEST: CPT

## 2022-08-16 PROCEDURE — 74011000258 HC RX REV CODE- 258: Performed by: HOSPITALIST

## 2022-08-16 PROCEDURE — 93306 TTE W/DOPPLER COMPLETE: CPT

## 2022-08-16 PROCEDURE — 74011250637 HC RX REV CODE- 250/637: Performed by: NURSE PRACTITIONER

## 2022-08-16 PROCEDURE — 74011250636 HC RX REV CODE- 250/636: Performed by: INTERNAL MEDICINE

## 2022-08-16 PROCEDURE — 77010033678 HC OXYGEN DAILY

## 2022-08-16 RX ORDER — SPIRONOLACTONE 25 MG/1
25 TABLET ORAL DAILY
Status: DISCONTINUED | OUTPATIENT
Start: 2022-08-17 | End: 2022-08-19 | Stop reason: HOSPADM

## 2022-08-16 RX ORDER — FUROSEMIDE 10 MG/ML
40 INJECTION INTRAMUSCULAR; INTRAVENOUS 2 TIMES DAILY
Status: DISCONTINUED | OUTPATIENT
Start: 2022-08-16 | End: 2022-08-16

## 2022-08-16 RX ORDER — METOPROLOL SUCCINATE 25 MG/1
25 TABLET, EXTENDED RELEASE ORAL
Status: COMPLETED | OUTPATIENT
Start: 2022-08-16 | End: 2022-08-16

## 2022-08-16 RX ORDER — FUROSEMIDE 10 MG/ML
40 INJECTION INTRAMUSCULAR; INTRAVENOUS DAILY
Status: DISCONTINUED | OUTPATIENT
Start: 2022-08-17 | End: 2022-08-17

## 2022-08-16 RX ORDER — METOPROLOL SUCCINATE 50 MG/1
50 TABLET, EXTENDED RELEASE ORAL DAILY
Status: DISCONTINUED | OUTPATIENT
Start: 2022-08-17 | End: 2022-08-19 | Stop reason: HOSPADM

## 2022-08-16 RX ADMIN — LISINOPRIL 10 MG: 5 TABLET ORAL at 10:44

## 2022-08-16 RX ADMIN — METOPROLOL SUCCINATE 25 MG: 25 TABLET, FILM COATED, EXTENDED RELEASE ORAL at 10:44

## 2022-08-16 RX ADMIN — ENOXAPARIN SODIUM 40 MG: 100 INJECTION SUBCUTANEOUS at 21:25

## 2022-08-16 RX ADMIN — SODIUM CHLORIDE 1 G: 900 INJECTION INTRAVENOUS at 13:22

## 2022-08-16 RX ADMIN — SODIUM CHLORIDE, PRESERVATIVE FREE 10 ML: 5 INJECTION INTRAVENOUS at 21:25

## 2022-08-16 RX ADMIN — METOPROLOL SUCCINATE 25 MG: 25 TABLET, EXTENDED RELEASE ORAL at 13:22

## 2022-08-16 RX ADMIN — VANCOMYCIN HYDROCHLORIDE 1000 MG: 1 INJECTION, POWDER, LYOPHILIZED, FOR SOLUTION INTRAVENOUS at 10:44

## 2022-08-16 RX ADMIN — ENOXAPARIN SODIUM 40 MG: 100 INJECTION SUBCUTANEOUS at 10:44

## 2022-08-16 RX ADMIN — SODIUM CHLORIDE, PRESERVATIVE FREE 10 ML: 5 INJECTION INTRAVENOUS at 06:09

## 2022-08-16 RX ADMIN — LORAZEPAM 2 MG: 2 INJECTION INTRAMUSCULAR; INTRAVENOUS at 08:38

## 2022-08-16 RX ADMIN — VANCOMYCIN HYDROCHLORIDE 1000 MG: 1 INJECTION, POWDER, LYOPHILIZED, FOR SOLUTION INTRAVENOUS at 02:21

## 2022-08-16 RX ADMIN — Medication 100 MG: at 10:44

## 2022-08-16 RX ADMIN — EMPAGLIFLOZIN 10 MG: 10 TABLET, FILM COATED ORAL at 17:40

## 2022-08-16 RX ADMIN — Medication 1 TABLET: at 10:44

## 2022-08-16 RX ADMIN — SODIUM CHLORIDE, PRESERVATIVE FREE 10 ML: 5 INJECTION INTRAVENOUS at 13:23

## 2022-08-16 RX ADMIN — VANCOMYCIN HYDROCHLORIDE 1000 MG: 1 INJECTION, POWDER, LYOPHILIZED, FOR SOLUTION INTRAVENOUS at 17:40

## 2022-08-16 RX ADMIN — ASPIRIN 81 MG: 81 TABLET, CHEWABLE ORAL at 10:44

## 2022-08-16 RX ADMIN — FUROSEMIDE 40 MG: 10 INJECTION, SOLUTION INTRAMUSCULAR; INTRAVENOUS at 10:45

## 2022-08-16 RX ADMIN — IOPAMIDOL 100 ML: 755 INJECTION, SOLUTION INTRAVENOUS at 09:04

## 2022-08-16 RX ADMIN — FOLIC ACID 1 MG: 1 TABLET ORAL at 10:44

## 2022-08-16 RX ADMIN — POTASSIUM CHLORIDE 40 MEQ: 20 TABLET, EXTENDED RELEASE ORAL at 10:44

## 2022-08-16 NOTE — PROGRESS NOTES
0700: Bedside shift change report given to Gloria Escobar (oncoming nurse) by Denny Severs RN (offgoing nurse). Report included the following information SBAR and Kardex. 0815: Pt tolerating lying flat better. D/w CT; transport entered; d/w transport eta 0845 for pickup. Will pre-medicate with IV Ativan.     0825: Asked Echo about time for imaging; no time at the moment. 0840: Pt BRYANT for CT. Pre-medicated with IV Ativan.

## 2022-08-16 NOTE — CONSULTS
GI CONSULTATION NOTE  Harrell Cowden, MINO  [phone] NP in-hospital cell phone M-F until 4:30  After 5pm or on weekends, please call  for physician on call    NAME: Akiko Murrell   :  1985   MRN:  [id number]   Attending:  Dr Daly Lopez  Primary GI:  Dr Esme Webster   Date/Time:  2022 4:12 PM  Assessment:   New diagnosis of Cirrhosis on CT scan  Extensive Heart Failure likely causing liver congestion leading to cirrhosis  ETOH abuse - can consider alcohol induced cardiomyopathy  Morbid Obesity - can increase risk for ESCALANTE  - LFT's overall normal - today TB up to 1.2  - No abdominal pain  - 20-25 shots of liquor per week [facility]  but drinking daily since age 24  - no previous liver disease or work up  - CT Scan 1. Cirrhosis, with trace ascites. Elevated BP  NSVT  Proteinuria  Cellulitis of Legs    Plan:   Full serolgic work up for liver disease  Needs to optimize cardiac status to reduce pressure off of liver  Okay to add spirolactone daily 25mg  Stop ETOH abuse completely - discussed with pt  Recommend pt get to [facility] for liver management as since he is so young, may need further work up over lifetime including liver bx and possible transplant. Plan discussed with Dr Esme Webster. Thank you for consulation. Nothing further to add during inpt stay, will sign off. Subjective:     HISTORY OF PRESENT ILLNESS:     Akiko Murrell is an 44 y.o.  male who we are asked to see for complaint of cirrhosis new dx, CHF. Medical history Sulphur Palsy and morbid obesity. Pt presented to hospital on 22 for SOB and edema. Pt has had extensive work up noting new dx of CHF, anasarca and now on CT scan noting Cirrhosis, and HTN. Pt denies any known history of liver disease but has been drinking ETOH consistently since age 24 and now up to 25 shots of liquor a week since . No hx of drug abuse or hepatitis. No previous evaluation. LFts are unremarkable.        Past Medical History:   Diagnosis Date    Bell's palsy     x 2; left side    Papilledema of both eyes due to increased intracranial pressure       History reviewed. No pertinent surgical history. Social History     Tobacco Use    Smoking status: Not on file    Smokeless tobacco: Not on file   Substance Use Topics    Alcohol use: Not on file      Family History   Problem Relation Age of Onset    Thyroid Disease Mother     Cancer Paternal Grandmother     Cancer Paternal Grandfather       Allergies   Allergen Reactions    Penicillins Hives      Prior to Admission medications    Medication Sig Start Date End Date Taking? Authorizing Provider   aspirin 81 mg chewable tablet Take 81 mg by mouth in the morning. Yes Other, MD Jelena   ibuprofen (MOTRIN) 800 mg tablet Take 800 mg by mouth. Indications: pain, swelling   Yes Provider, Historical       Patient Active Problem List   Diagnosis Code    Cardiomegaly I51.7    Scrotal edema N50.89    Bilateral leg edema R60.0    Acute CHF (congestive heart failure) (HCC) I50.9       REVIEW OF SYSTEMS:    Constitutional: negative fever, negative chills, negative weight loss  Eyes:   negative visual changes  ENT:   negative sore throat, tongue or lip swelling   Respiratory:  negative cough, negative dyspnea  Cards:  negative for chest pain, palpitations  GI:   See HPI  :  negative for frequency, dysuria  Integument:  negative for rash and pruritus  Heme:  negative for easy bruising and gum/nose bleeding  Musculoskel: negative for myalgias,  back pain and muscle weakness  Neuro:  negative for headaches, dizziness, vertigo  Psych:  negative for feelings of anxiety, depression     Pertinent Positives: BLE Edema      Objective:   VITALS:    Visit Vitals  /78   Pulse 95   Temp 98.3 °F (36.8 °C)   Resp 20   Ht 6' 3\" (1.905 m)   Wt 146.5 kg (322 lb 15.6 oz)   SpO2 91%   BMI 40.37 kg/m²       PHYSICAL EXAM:   General:          Alert, WD, WN, cooperative, no distress, appears stated age. Head:               Normocephalic, without obvious abnormality, atraumatic. Eyes:               Conjunctivae clear and pale, anicteric sclerae. Pupils are equal  Nose:               Nares normal.   Throat:             Lips, mucosa, and tongue normal.    Neck:               Supple, symmetrical,  no adenopathy, thyroid: non tender  Back:               Symmetric,  No CVA tenderness. Lungs:             CTA bilaterally. Chest wall:      No tenderness or deformity. Heart:              Regular rate and rhythm,  no murmur, rub or gallop. Abdomen:        Soft, non-tender. Not distended. Bowel sounds normal. No masses  Extremities:     Atraumatic, No cyanosis. BLE edema and erythema  Skin:                Texture, turgor normal. No rashes/lesions/jaundice  Lymph:            Cervical, supraclavicular normal.  Psych:             Good insight. Not depressed. Not anxious or agitated. Neurologic:      EOMs intact. No facial asymmetry. No aphasia or slurred speech. Normal                        strength, A/O X 3.      LAB DATA REVIEWED:    Recent Results (from the past 24 hour(s))   METABOLIC PANEL, BASIC    Collection Time: 08/16/22  2:32 AM   Result Value Ref Range    Sodium 136 136 - 145 mmol/L    Potassium 3.7 3.5 - 5.1 mmol/L    Chloride 93 (L) 97 - 108 mmol/L    CO2 41 (HH) 21 - 32 mmol/L    Anion gap 2 (L) 5 - 15 mmol/L    Glucose 112 (H) 65 - 100 mg/dL    BUN 17 6 - 20 MG/DL    Creatinine 0.93 0.70 - 1.30 MG/DL    BUN/Creatinine ratio 18 12 - 20      GFR est AA >60 >60 ml/min/1.73m2    GFR est non-AA >60 >60 ml/min/1.73m2    Calcium 9.5 8.5 - 10.1 MG/DL   ECHO ADULT COMPLETE    Collection Time: 08/16/22  9:58 AM   Result Value Ref Range    IVSd 1.2 (A) 0.6 - 1.0 cm    LVIDd 6.5 (A) 4.2 - 5.9 cm    LVIDs 5.5 cm    LVOT Diameter 2.5 cm    LVPWd 1.2 (A) 0.6 - 1.0 cm    LVOT Peak Gradient 2 mmHg    LVOT Mean Gradient 1 mmHg    LVOT SV 69.7 ml    LVOT Peak Velocity 0.7 m/s    LVOT VTI 14.2 cm    RVSP 45 mmHg    RV Free Wall Peak S' 13 cm/s    LA Diameter 4.6 cm    LA Volume 4C 76 (A) 18 - 58 mL    Est. RA Pressure 10 mmHg    AV Area by Peak Velocity 2.6 cm2    AV Area by VTI 3.3 cm2    AV Peak Gradient 8 mmHg    AV Mean Gradient 5 mmHg    AV Peak Velocity 1.4 m/s    AV Mean Velocity 1.1 m/s    AV VTI 21.8 cm    MV A Velocity 0.66 m/s    MV E Wave Deceleration Time 145.8 ms    MV E Velocity 1.18 m/s    LV E' Lateral Velocity 6 cm/s    LV E' Septal Velocity 6 cm/s    MV PHT 45.0 ms    MV Area by PHT 4.9 cm2    MV Area by VTI 2.9 cm2    MR Peak Gradient 88 mmHg    MR Peak Velocity 4.7 m/s    MV Peak Gradient 5 mmHg    MV Mean Gradient 4 mmHg    MV Max Velocity 1.1 m/s    MV Mean Velocity 0.9 m/s    MV VTI 23.9 cm    PV Peak Gradient 5 mmHg    PV Max Velocity 1.1 m/s    TAPSE 1.5 (A) 1.7 cm    TR Peak Gradient 34 mmHg    TR Max Velocity 2.94 m/s    Fractional Shortening 2D 15 28 - 44 %    LVIDd Index 2.42 cm/m2    LVIDs Index 2.04 cm/m2    LV RWT Ratio 0.37     LV Mass 2D 358.6 (A) 88 - 224 g    LV Mass 2D Index 133.3 (A) 49 - 115 g/m2    MV E/A 1.79     E/E' Ratio (Averaged) 19.67     E/E' Lateral 19.67     E/E' Septal 19.67     LVOT Stroke Volume Index 25.9 mL/m2    LVOT Area 4.9 cm2    LA Volume Index 4C 28 16 - 34 mL/m2    LA Size Index 1.71 cm/m2    AV Velocity Ratio 0.50     LVOT:AV VTI Index 0.65     INOCENCIO/BSA VTI 1.2 cm2/m2    INOCENCIO/BSA Peak Velocity 1.0 cm2/m2    MV:LVOT VTI Index 1.68        IMAGING RESULTS:  I have personally reviewed the imaging reports    DATE 8/16/22  EXAM: CTA CHEST W OR W WO CONT, CT ABD PELV W CONT     INDICATION: Abdominal swelling, rash, testicle swelling     COMPARISON: None     CONTRAST: 100 mL of Isovue-370. TECHNIQUE:  CTA chest: Helical thin section chest CT following uneventful intravenous  administration of nonionic contrast 100 mL of isovue 370 according to  departmental PE protocol. Coronal and sagittal reformats were performed. 3D post  processing was performed.   CT dose reduction was achieved through the use of a  standardized protocol tailored for this examination and automatic exposure  control for dose modulation. CT abdomen/pelvis: Following the uneventful intravenous administration of  contrast, thin axial images were obtained through the abdomen and pelvis. Coronal and sagittal reconstructions were generated. Oral contrast was not  administered. CT dose reduction was achieved through use of a standardized  protocol tailored for this examination and automatic exposure control for dose  modulation. FINDINGS:  This is a good quality study for the evaluation of pulmonary embolism to the  first subsegmental arterial level. There is no pulmonary embolism to this level. Enlarged main pulmonary artery, 4.1 cm. THYROID: No nodule. MEDIASTINUM: No mass or lymphadenopathy. SHAKEEL: No mass or lymphadenopathy. THORACIC AORTA: No aneurysm. HEART: Cardiomegaly. Coronary artery calcifications. ESOPHAGUS: No wall thickening or dilatation. TRACHEA/BRONCHI: Patent. PLEURA: No effusion or pneumothorax. LUNGS: Bibasilar linear atelectasis. LIVER: Cirrhosis. BILIARY TREE: Gallbladder is within normal limits. CBD is not dilated. SPLEEN: within normal limits. PANCREAS: No mass or ductal dilatation. ADRENALS: 1.4 cm hypodense right adrenal nodule compatible with lipid rich  adrenal adenoma, this is a benign entity requiring no further evaluation. KIDNEYS: No mass, calculus, or hydronephrosis. STOMACH: Unremarkable. SMALL BOWEL: No dilatation or wall thickening. COLON: No dilatation or wall thickening. Diffuse colonic diverticulosis without  evidence of diverticulitis. APPENDIX: Unremarkable. PERITONEUM: Trace ascites. RETROPERITONEUM: No lymphadenopathy or aortic aneurysm. REPRODUCTIVE ORGANS: Normal prostate. URINARY BLADDER: No mass or calculus. BONES: No destructive bone lesion. ABDOMINAL WALL: No mass or hernia. ADDITIONAL COMMENTS: N/A     IMPRESSION     1.  Cirrhosis, with trace ascites. 2. Enlarged main pulmonary artery compatible with pulmonary arterial  hypertension. No pulmonary embolism. 3. Bibasilar linear atelectasis. 4. Cardiomegaly. 5. Incidental findings as detailed above. Total time spent with patient: 50 minutes ________________________________________________________________________  Care Plan discussed with:  Patient y   Family  Y - father and grandfather   MECHELLE Wilkerson              Consultant:  rios MARMOLEJO  8/16/2022:  ________________________________________________________________________    ___________________________________________________  Consulting Provider:  Annamarie Monique NP      8/16/2022  4:12 PM

## 2022-08-16 NOTE — PROGRESS NOTES
Problem: Falls - Risk of  Goal: *Absence of Falls  Description: Document Green Salvia Fall Risk and appropriate interventions in the flowsheet.   Outcome: Progressing Towards Goal  Note: Fall Risk Interventions:            Medication Interventions: Bed/chair exit alarm, Patient to call before getting OOB, Teach patient to arise slowly         History of Falls Interventions: Bed/chair exit alarm, Utilize gait belt for transfer/ambulation, Assess for delayed presentation/identification of injury for 48 hrs (comment for end date)         Problem: General Medical Care Plan  Goal: *Labs within defined limits  Outcome: Progressing Towards Goal     Problem: Heart Failure: Day 3  Goal: Activity/Safety  Outcome: Progressing Towards Goal  Goal: Respiratory  Outcome: Progressing Towards Goal  Goal: Treatments/Interventions/Procedures  Outcome: Progressing Towards Goal  Goal: *Oxygen saturation within defined limits  Outcome: Progressing Towards Goal  Goal: *Optimal pain control at patient's stated goal  Outcome: Progressing Towards Goal  Goal: *Anxiety reduced or absent  Outcome: Progressing Towards Goal

## 2022-08-16 NOTE — PROGRESS NOTES
[facility]  García Patinome[address]  [facility], [address] [place] [address]  [phone]  WWW. [facility]  CARDIOLOGY PROGRESS NOTE    8/16/2022 8:19 AM    Admit Date: 8/13/2022    Admit Diagnosis:   Bilateral leg edema [R60.0]  Cardiomegaly [I51.7]  Acute CHF (congestive heart failure) (HCC) [I50.9]  Scrotal edema [N50.89]    Subjective: Nahomy Chambers reports feeling will this am able to perform CT scans .      Visit Vitals  /85 (BP 1 Location: Left upper arm, BP Patient Position: At rest)   Pulse 92   Temp 97.9 °F (36.6 °C)   Resp 18   Ht 6' 3\" (1.905 m)   Wt 146.5 kg (323 lb)   SpO2 91%   BMI 40.37 kg/m²       Current Facility-Administered Medications   Medication Dose Route Frequency    furosemide (LASIX) injection 40 mg  40 mg IntraVENous BID    potassium chloride (K-DUR, KLOR-CON M20) SR tablet 40 mEq  40 mEq Oral DAILY    metoprolol succinate (TOPROL-XL) XL tablet 25 mg  25 mg Oral DAILY    LORazepam (ATIVAN) injection 2 mg  2 mg IntraVENous ONCE PRN    nicotine (NICODERM CQ) 14 mg/24 hr patch 1 Patch  1 Patch TransDERmal DAILY    cefTRIAXone (ROCEPHIN) 1 g in 0.9% sodium chloride (MBP/ADV) 50 mL MBP  1 g IntraVENous Q24H    diphenhydrAMINE (BENADRYL) 12.5 mg/5 mL oral liquid 25 mg  25 mg Oral Q6H PRN    aspirin chewable tablet 81 mg  81 mg Oral DAILY    sodium chloride (NS) flush 5-40 mL  5-40 mL IntraVENous Q8H    sodium chloride (NS) flush 5-40 mL  5-40 mL IntraVENous PRN    acetaminophen (TYLENOL) tablet 650 mg  650 mg Oral Q6H PRN    Or    acetaminophen (TYLENOL) suppository 650 mg  650 mg Rectal Q6H PRN    polyethylene glycol (MIRALAX) packet 17 g  17 g Oral DAILY PRN    ondansetron (ZOFRAN ODT) tablet 4 mg  4 mg Oral Q8H PRN    Or    ondansetron (ZOFRAN) injection 4 mg  4 mg IntraVENous Q6H PRN    enoxaparin (LOVENOX) injection 40 mg  40 mg SubCUTAneous BID    lisinopriL (PRINIVIL, ZESTRIL) tablet 10 mg  10 mg Oral DAILY    diazePAM (VALIUM) tablet 10 mg  10 mg Oral Q1H PRN    diazePAM (VALIUM) tablet 20 mg  20 mg Oral C2P PRN    folic acid (FOLVITE) tablet 1 mg  1 mg Oral DAILY    thiamine mononitrate (B-1) tablet 100 mg  100 mg Oral DAILY    multivitamin, tx-iron-ca-min (THERA-M w/ IRON) tablet 1 Tablet  1 Tablet Oral DAILY    vancomycin (VANCOCIN) 1,000 mg in 0.9% sodium chloride 250 mL ([id number])  1,000 mg IntraVENous Q8H         Objective:      Physical Exam    General: Well developed, in no acute distress, cooperative and alert  HEENT: No carotid bruits, no JVD, trach is midline. Neck Supple, PERRL, EOM intact. Heart:  Normal S1/S2 negative S3 or S4. Regular, no murmur, gallop or rub. Respiratory: Clear bilaterally x 4, no wheezing or rales  Abdomen:   Soft, non-tender, no masses, bowel sounds are active. Extremities: 1+ pitting LE edema . Neuro: A&Ox3, speech clear,       Data Review:   Recent Labs     08/15/22  0204 08/14/22  0215 08/13/22  1452   WBC 7.6 8.0 6.3   HGB 14.2 14.3 14.0   HCT 47.1 45.2 44.2    220 231     Recent Labs     08/16/22  0232 08/15/22  0220 08/15/22  0204 08/14/22  0215 08/13/22  1452     --  139 137 139   K 3.7  --  3.6 3.6 4.2   CL 93*  --  96* 100 104   CO2 41*  --  38* 32 27   *  --  119* 93 120*   BUN 17  --  14 17 17   CREA 0.93  --  0.95 1.08 1.06   CA 9.5  --  9.0 8.8 8.6   MG  --  2.0  --  1.7  --    ALB  --   --   --  3.1* 3.2*   TBILI  --   --   --  1.2* 0.8   ALT  --   --   --  49 52       No results for input(s): TROIQ, CPK, CKMB in the last 72 hours.       Intake/Output Summary (Last 24 hours) at 8/16/2022 0658  Last data filed at 8/16/2022 0216  Gross per 24 hour   Intake 1350 ml   Output 6970 ml   Net -5620 ml        Telemetry: NSR  EKG:  Cxray:    Assessment:     Active Problems:    Cardiomegaly (8/13/2022)      Scrotal edema (8/13/2022)      Bilateral leg edema (8/13/2022)      [facility] (8/13/2022)      Plan:     CHF: ECHO pending,  output of 14liters, anion gap 2, reduce lasix to 40mg every day . ECHO pending. CT chest/Abd Pelvis performed indicating cirrhosis    PVC/nsvt: Increase toprol to 50mg  keep K+4.0, magnesium 2.0     3. pHTN: Seen on CT, recommend out patient ROSEMARIE evaluation     ECHO performed, results pending, 46lbs wt loss since admission, he is near his baseline weight, Lasix reduced to 40mg daily from 120mg daily. Will consult GI regarding cirrhosis +/- need for spironolactone. Hanna Lagos, MINO       [facility]             Patient seen, examined by me personally. Plan discussed as detailed. Agree with note as outlined by  NP with modifications as noted. My independent physical exam reveals : Physical Exam  Vitals and nursing note reviewed. Constitutional:       Appearance: He is obese. Cardiovascular:      Rate and Rhythm: Normal rate and regular rhythm. Heart sounds: Normal heart sounds. Pulmonary:      Breath sounds: Normal breath sounds. Musculoskeletal:      Right lower leg: Edema present. Left lower leg: Edema present. Skin:     General: Skin is warm. Neurological:      Mental Status: He is alert and oriented to person, place, and time. Psychiatric:         Mood and Affect: Mood normal.        No additional findings noted. Agree with plan as outlined above with modifications as noted. EF 25-30%. Start GDMT. Will d/c lisinopril. Start entresto Thursday, continue metoprolol. Add SGLT2, consider spironolactone. His LFT's, renal function are normal. Will need lifevest before discharge. Probably alcohol induced cardiomyopathy. Discussed abstinence from alcohol.     Sin Pimentel MD

## 2022-08-16 NOTE — PROGRESS NOTES
End of Shift Note    Bedside shift change report given to Heaven Frye (oncoming nurse) by Alhaji Cervantes (offgoing nurse). Report included the following information SBAR, Kardex, MAR, and Recent Results    Shift worked: Night     Shift summary and any significant changes:    0232: Critical CO2 41. Notified NP. Pt requesting to go to CT this AM.     Concerns for physician to address:       Zone phone for oncoming shift:          Activity:  Activity Level: Up ad florencio  Number times ambulated in hallways past shift: 0  Number of times OOB to chair past shift: 1    Cardiac:   Cardiac Monitoring: Yes      Cardiac Rhythm: Sinus Rhythm    Access:  Current line(s): PIV     Genitourinary:   Urinary status: voiding    Respiratory:   O2 Device: Nasal cannula  Chronic home O2 use?: NO  Incentive spirometer at bedside: NO       GI:  Last Bowel Movement Date: 08/13/22  Current diet:  ADULT DIET Regular; Low Fat/Low Chol/High Fiber/SHANTAL  Passing flatus: YES  Tolerating current diet: YES       Pain Management:   Patient states pain is manageable on current regimen: YES    Skin:  Jean Paul Score: 21  Interventions: increase time out of bed    Patient Safety:  Fall Score:  Total Score: 1  Interventions: gripper socks and pt to call before getting OOB       Length of Stay:  Expected LOS: 3d 0h  Actual LOS: [facility]

## 2022-08-16 NOTE — PROGRESS NOTES
Hospitalist Progress Note    NAME: Natalio Lebron   :  1985   MRN:  [id number]       Assessment / Plan:  New onset acute systolic HF, new onset Right/Left ventricular failure, POA   Dilated CMP  Cirrhosis   Anasarca  Acute hypoxic respiratory failure  Pulm HTN  with b/l tense leg edema, abdominal wall edema, scrotal edema, elevated probnp, weight gain of 30# in 3 weeks  TSH 2.49  D-dimer elevated but CTA neg  UDS negative  Lasix dose lowered from 120 mg daily to 40 daily  Aldactone added  DC lisinopril, Start entresto on Thursday   Cont Metoprolol   SGLT2 added by Cardio   Needs life vest prior to DC  Counseled re abstinence from alcohol   Sleep study as outpt. Pulm [place] as well for P HTN findings on CT  GI consulted   Hepatology appointment on DC     Elevated blood pressures likely HTN  NSVT  BP was elevated on admission, now controlled  Anti HTN as above     Proteinuria, r/o nephrotic syndrome  Patient creatinine normal, urine protein creatinine ratio is normal  Nephro consult canceled     Bilateral leg cellulitis and abdominal wall cellulitis, POA  Sinus tachycardia  Blood Culture negative to date  --mrsa nasal swab  -c/w  rocephin and vancomycin  Lower extremity Doppler negative for DVT    Excessive alcohol use  Tobacco abuse  --drinks 20-25 shots of bourbon or pints of beer a week  --monitor for withdrawal  --MVI, thiamine, folic acid. --CIWA with prn oral valium  Patient counseled about cessation, will order nicotine patch     Obesity  Body mass index is 46.02 kg/m². Pre DM  Diet modification   Jardiance started by Cardio     Code: full  DVT prophylaxis: lovenox  Surrogate decision maker:  wife Tad Sheppard [phone]  36 or above Morbid obesity / Body mass index is 40.37 kg/m². Estimated discharge date: >48 hrs   Code status: Full  Prophylaxis: Lovenox  Recommended Disposition: Home w/Family     Subjective:     Chief Complaint / Reason for Physician Visit  Fu new onset CHF .    Discussed with RN events overnight. Patient still have persistent edema but overall feels better  Skin rash improving  Less SOB   Able to lay flat  No fever    Review of Systems:  Symptom Y/N Comments  Symptom Y/N Comments   Fever/Chills n   Chest Pain n    Poor Appetite    Edema     Cough    Abdominal Pain n    Sputum    Joint Pain     SOB/MORENO y   Pruritis/Rash     Nausea/vomit n   Tolerating PT/OT     Diarrhea    Tolerating Diet y    Constipation    Other       Could NOT obtain due to:      Objective:     VITALS:   Last 24hrs VS reviewed since prior progress note. Most recent are:  Patient Vitals for the past 24 hrs:   Temp Pulse Resp BP SpO2   08/16/22 1445 98.3 °F (36.8 °C) 95 20 128/78 --   08/16/22 1035 97.5 °F (36.4 °C) 97 18 119/86 91 %   08/16/22 0937 -- -- -- (!) 115/98 --   08/16/22 0758 97.6 °F (36.4 °C) 93 20 (!) 115/98 91 %   08/16/22 0219 97.9 °F (36.6 °C) 92 18 118/85 91 %   08/15/22 2356 97.5 °F (36.4 °C) 89 16 115/81 94 %   08/15/22 1928 98.4 °F (36.9 °C) 98 15 (!) 122/91 93 %         Intake/Output Summary (Last 24 hours) at 8/16/2022 1907  Last data filed at 8/16/2022 1327  Gross per 24 hour   Intake 600 ml   Output 4530 ml   Net -3930 ml          I had a face to face encounter and independently examined this patient on 8/16/2022, as outlined below:  PHYSICAL EXAM:  General: WD, WN. Alert, cooperative, no acute distress    EENT:  EOMI. Anicteric sclerae. MMM  Resp:  no wheezing.  + rales. No accessory muscle use  CV:  Regular  rhythm,  b/l edema   GI:  Soft, Non distended, Non tender. +Bowel sounds  Neurologic:  Alert and oriented X 3, normal speech,   Psych:   Good insight. Not anxious nor agitated  Skin:  Diffuse erythema of LE and abd wall.   No jaundice    Reviewed most current lab test results and cultures  YES  Reviewed most current radiology test results   YES  Review and summation of old records today    NO  Reviewed patient's current orders and MAR    YES  PMH/SH reviewed - no change compared to H&P  ________________________________________________________________________  Care Plan discussed with:    Comments   Patient x    Family  x    RN x    Care Manager x    Consultant                       x Multidiciplinary team rounds were held today with , nursing, pharmacist and clinical coordinator. Patient's plan of care was discussed; medications were reviewed and discharge planning was addressed. ________________________________________________________________________  Total NON critical care TIME:  40  Minutes    Total CRITICAL CARE TIME Spent:   Minutes non procedure based      Comments   >50% of visit spent in counseling and coordination of care     ________________________________________________________________________  Jose Kendall MD     Procedures: see electronic medical records for all procedures/Xrays and details which were not copied into this note but were reviewed prior to creation of Plan. LABS:  I reviewed today's most current labs and imaging studies.   Pertinent labs include:  Recent Labs     08/15/22  0204 08/14/22  0215   WBC 7.6 8.0   HGB 14.2 14.3   HCT 47.1 45.2    220       Recent Labs     08/16/22  0232 08/15/22  0220 08/15/22  0204 08/14/22  0215     --  139 137   K 3.7  --  3.6 3.6   CL 93*  --  96* 100   CO2 41*  --  38* 32   *  --  119* 93   BUN 17  --  14 17   CREA 0.93  --  0.95 1.08   CA 9.5  --  9.0 8.8   MG  --  2.0  --  1.7   ALB  --   --   --  3.1*   TBILI  --   --   --  1.2*   ALT  --   --   --  49         Signed: Jose Kendall MD

## 2022-08-17 ENCOUNTER — APPOINTMENT (OUTPATIENT)
Dept: NUCLEAR MEDICINE | Age: 37
DRG: 291 | End: 2022-08-17
Attending: NURSE PRACTITIONER
Payer: COMMERCIAL

## 2022-08-17 ENCOUNTER — APPOINTMENT (OUTPATIENT)
Dept: NON INVASIVE DIAGNOSTICS | Age: 37
DRG: 291 | End: 2022-08-17
Attending: NURSE PRACTITIONER
Payer: COMMERCIAL

## 2022-08-17 LAB
ALBUMIN SERPL-MCNC: 3.4 G/DL (ref 3.5–5)
ALBUMIN/GLOB SERPL: 1 {RATIO} (ref 1.1–2.2)
ALP SERPL-CCNC: 82 U/L (ref 45–117)
ALT SERPL-CCNC: 53 U/L (ref 12–78)
ANION GAP SERPL CALC-SCNC: 2 MMOL/L (ref 5–15)
AST SERPL-CCNC: 53 U/L (ref 15–37)
BILIRUB DIRECT SERPL-MCNC: 0.2 MG/DL (ref 0–0.2)
BILIRUB SERPL-MCNC: 0.8 MG/DL (ref 0.2–1)
BUN SERPL-MCNC: 15 MG/DL (ref 6–20)
BUN/CREAT SERPL: 17 (ref 12–20)
CALCIUM SERPL-MCNC: 9.8 MG/DL (ref 8.5–10.1)
CHLORIDE SERPL-SCNC: 95 MMOL/L (ref 97–108)
CO2 SERPL-SCNC: 39 MMOL/L (ref 21–32)
CREAT SERPL-MCNC: 0.87 MG/DL (ref 0.7–1.3)
DATE LAST DOSE: ABNORMAL
ERYTHROCYTE [DISTWIDTH] IN BLOOD BY AUTOMATED COUNT: 14.6 % (ref 11.5–14.5)
GLOBULIN SER CALC-MCNC: 3.4 G/DL (ref 2–4)
GLUCOSE SERPL-MCNC: 104 MG/DL (ref 65–100)
HCT VFR BLD AUTO: 47.6 % (ref 36.6–50.3)
HGB BLD-MCNC: 14.7 G/DL (ref 12.1–17)
INR PPP: 1.1 (ref 0.9–1.1)
MCH RBC QN AUTO: 28.3 PG (ref 26–34)
MCHC RBC AUTO-ENTMCNC: 30.9 G/DL (ref 30–36.5)
MCV RBC AUTO: 91.5 FL (ref 80–99)
NRBC # BLD: 0 K/UL (ref 0–0.01)
NRBC BLD-RTO: 0 PER 100 WBC
PLATELET # BLD AUTO: 264 K/UL (ref 150–400)
PMV BLD AUTO: 11.7 FL (ref 8.9–12.9)
POTASSIUM SERPL-SCNC: 3.8 MMOL/L (ref 3.5–5.1)
PROT SERPL-MCNC: 6.8 G/DL (ref 6.4–8.2)
PROTHROMBIN TIME: 11.4 SEC (ref 9–11.1)
RBC # BLD AUTO: 5.2 M/UL (ref 4.1–5.7)
REPORTED DOSE,DOSE: ABNORMAL UNITS
REPORTED DOSE/TIME,TMG: ABNORMAL
SODIUM SERPL-SCNC: 136 MMOL/L (ref 136–145)
VANCOMYCIN TROUGH SERPL-MCNC: 10.7 UG/ML (ref 5–10)
WBC # BLD AUTO: 7.1 K/UL (ref 4.1–11.1)

## 2022-08-17 PROCEDURE — 36415 COLL VENOUS BLD VENIPUNCTURE: CPT

## 2022-08-17 PROCEDURE — 86015 ACTIN ANTIBODY EACH: CPT

## 2022-08-17 PROCEDURE — 74011000250 HC RX REV CODE- 250: Performed by: INTERNAL MEDICINE

## 2022-08-17 PROCEDURE — 80048 BASIC METABOLIC PNL TOTAL CA: CPT

## 2022-08-17 PROCEDURE — 78452 HT MUSCLE IMAGE SPECT MULT: CPT

## 2022-08-17 PROCEDURE — 85027 COMPLETE CBC AUTOMATED: CPT

## 2022-08-17 PROCEDURE — 74011250636 HC RX REV CODE- 250/636: Performed by: HOSPITALIST

## 2022-08-17 PROCEDURE — 74011000250 HC RX REV CODE- 250: Performed by: HOSPITALIST

## 2022-08-17 PROCEDURE — 74011250637 HC RX REV CODE- 250/637: Performed by: NURSE PRACTITIONER

## 2022-08-17 PROCEDURE — 74011250636 HC RX REV CODE- 250/636: Performed by: INTERNAL MEDICINE

## 2022-08-17 PROCEDURE — 80202 ASSAY OF VANCOMYCIN: CPT

## 2022-08-17 PROCEDURE — 82390 ASSAY OF CERULOPLASMIN: CPT

## 2022-08-17 PROCEDURE — 74011000258 HC RX REV CODE- 258: Performed by: HOSPITALIST

## 2022-08-17 PROCEDURE — 86038 ANTINUCLEAR ANTIBODIES: CPT

## 2022-08-17 PROCEDURE — A9500 TC99M SESTAMIBI: HCPCS

## 2022-08-17 PROCEDURE — 77010033678 HC OXYGEN DAILY

## 2022-08-17 PROCEDURE — 74011250637 HC RX REV CODE- 250/637: Performed by: HOSPITALIST

## 2022-08-17 PROCEDURE — 86381 MITOCHONDRIAL ANTIBODY EACH: CPT

## 2022-08-17 PROCEDURE — 65270000046 HC RM TELEMETRY

## 2022-08-17 PROCEDURE — 85610 PROTHROMBIN TIME: CPT

## 2022-08-17 PROCEDURE — 74011250637 HC RX REV CODE- 250/637: Performed by: INTERNAL MEDICINE

## 2022-08-17 PROCEDURE — 80076 HEPATIC FUNCTION PANEL: CPT

## 2022-08-17 PROCEDURE — 74011250636 HC RX REV CODE- 250/636: Performed by: NURSE PRACTITIONER

## 2022-08-17 RX ORDER — FUROSEMIDE 10 MG/ML
60 INJECTION INTRAMUSCULAR; INTRAVENOUS DAILY
Status: DISCONTINUED | OUTPATIENT
Start: 2022-08-18 | End: 2022-08-18

## 2022-08-17 RX ORDER — SODIUM CHLORIDE 0.9 % (FLUSH) 0.9 %
10 SYRINGE (ML) INJECTION AS NEEDED
Status: DISCONTINUED | OUTPATIENT
Start: 2022-08-17 | End: 2022-08-19 | Stop reason: HOSPADM

## 2022-08-17 RX ORDER — TETRAKIS(2-METHOXYISOBUTYLISOCYANIDE)COPPER(I) TETRAFLUOROBORATE 1 MG/ML
30.9 INJECTION, POWDER, LYOPHILIZED, FOR SOLUTION INTRAVENOUS
Status: COMPLETED | OUTPATIENT
Start: 2022-08-17 | End: 2022-08-17

## 2022-08-17 RX ADMIN — SPIRONOLACTONE 25 MG: 25 TABLET ORAL at 11:26

## 2022-08-17 RX ADMIN — TETRAKIS(2-METHOXYISOBUTYLISOCYANIDE)COPPER(I) TETRAFLUOROBORATE 30.9 MILLICURIE: 1 INJECTION, POWDER, LYOPHILIZED, FOR SOLUTION INTRAVENOUS at 10:15

## 2022-08-17 RX ADMIN — SODIUM CHLORIDE, PRESERVATIVE FREE 10 ML: 5 INJECTION INTRAVENOUS at 13:55

## 2022-08-17 RX ADMIN — EMPAGLIFLOZIN 10 MG: 10 TABLET, FILM COATED ORAL at 11:33

## 2022-08-17 RX ADMIN — ENOXAPARIN SODIUM 40 MG: 100 INJECTION SUBCUTANEOUS at 11:26

## 2022-08-17 RX ADMIN — REGADENOSON 0.4 MG: 0.08 INJECTION, SOLUTION INTRAVENOUS at 10:13

## 2022-08-17 RX ADMIN — VANCOMYCIN HYDROCHLORIDE 1000 MG: 1 INJECTION, POWDER, LYOPHILIZED, FOR SOLUTION INTRAVENOUS at 11:42

## 2022-08-17 RX ADMIN — SODIUM CHLORIDE 1 G: 900 INJECTION INTRAVENOUS at 13:55

## 2022-08-17 RX ADMIN — POTASSIUM CHLORIDE 40 MEQ: 20 TABLET, EXTENDED RELEASE ORAL at 11:26

## 2022-08-17 RX ADMIN — ENOXAPARIN SODIUM 40 MG: 100 INJECTION SUBCUTANEOUS at 21:20

## 2022-08-17 RX ADMIN — VANCOMYCIN HYDROCHLORIDE 1000 MG: 1 INJECTION, POWDER, LYOPHILIZED, FOR SOLUTION INTRAVENOUS at 19:56

## 2022-08-17 RX ADMIN — ASPIRIN 81 MG: 81 TABLET, CHEWABLE ORAL at 11:26

## 2022-08-17 RX ADMIN — FUROSEMIDE 40 MG: 10 INJECTION, SOLUTION INTRAMUSCULAR; INTRAVENOUS at 11:32

## 2022-08-17 RX ADMIN — SODIUM CHLORIDE, PRESERVATIVE FREE 10 ML: 5 INJECTION INTRAVENOUS at 06:39

## 2022-08-17 RX ADMIN — SODIUM CHLORIDE, PRESERVATIVE FREE 10 ML: 5 INJECTION INTRAVENOUS at 10:13

## 2022-08-17 RX ADMIN — Medication 1 TABLET: at 11:32

## 2022-08-17 RX ADMIN — METOPROLOL SUCCINATE 50 MG: 50 TABLET, EXTENDED RELEASE ORAL at 11:26

## 2022-08-17 RX ADMIN — FOLIC ACID 1 MG: 1 TABLET ORAL at 11:26

## 2022-08-17 RX ADMIN — Medication 100 MG: at 11:26

## 2022-08-17 RX ADMIN — VANCOMYCIN HYDROCHLORIDE 1000 MG: 1 INJECTION, POWDER, LYOPHILIZED, FOR SOLUTION INTRAVENOUS at 02:59

## 2022-08-17 NOTE — PROGRESS NOTES
Problem: Falls - Risk of  Goal: *Absence of Falls  Description: Document Brody Wu Fall Risk and appropriate interventions in the flowsheet.   Outcome: Progressing Towards Goal  Note: Fall Risk Interventions:            Medication Interventions: Patient to call before getting OOB, Teach patient to arise slowly         History of Falls Interventions: Bed/chair exit alarm, Utilize gait belt for transfer/ambulation, Assess for delayed presentation/identification of injury for 48 hrs (comment for end date)         Problem: General Medical Care Plan  Goal: *Vital signs within specified parameters  Outcome: Progressing Towards Goal  Goal: *Labs within defined limits  Outcome: Progressing Towards Goal  Goal: *Absence of infection signs and symptoms  Outcome: Progressing Towards Goal  Goal: *Optimal pain control at patient's stated goal  Outcome: Progressing Towards Goal  Goal: *Skin integrity maintained  Outcome: Progressing Towards Goal  Goal: *Fluid volume balance  Outcome: Progressing Towards Goal  Goal: *Optimize nutritional status  Outcome: Progressing Towards Goal  Goal: *Anxiety reduced or absent  Outcome: Progressing Towards Goal  Goal: *Progressive mobility and function (eg: ADL's)  Outcome: Progressing Towards Goal     Problem: Heart Failure: Day 4  Goal: Activity/Safety  Outcome: Progressing Towards Goal  Goal: Diagnostic Test/Procedures  Outcome: Progressing Towards Goal  Goal: Nutrition/Diet  Outcome: Progressing Towards Goal  Goal: Discharge Planning  Outcome: Progressing Towards Goal  Goal: Medications  Outcome: Progressing Towards Goal  Goal: Respiratory  Outcome: Progressing Towards Goal  Goal: Treatments/Interventions/Procedures  Outcome: Progressing Towards Goal  Goal: Psychosocial  Outcome: Progressing Towards Goal  Goal: *Oxygen saturation within defined limits  Outcome: Progressing Towards Goal  Goal: *Hemodynamically stable  Outcome: Progressing Towards Goal  Goal: *Optimal pain control at patient's stated goal  Outcome: Progressing Towards Goal  Goal: *Anxiety reduced or absent  Outcome: Progressing Towards Goal  Goal: *Demonstrates progressive activity  Outcome: Progressing Towards Goal

## 2022-08-17 NOTE — PROGRESS NOTES
Spiritual Care Assessment/Progress Note  [address]      NAME: Cy Hong      MRN: [id number]  AGE: 44 y.o.  SEX: male  Episcopalian Affiliation: Muslim   Language: English     8/17/2022     Total Time (in minutes): 19     Spiritual Assessment begun in [facility] through conversation with:         [x]Patient        [] Family    [] Friend(s)        Reason for Consult: Initial/Spiritual assessment, patient floor     Spiritual beliefs: (Please include comment if needed)     [x] Identifies with a dinesh tradition:    Advent     [x] Supported by a dinesh community:    [facility] Vandana Long        [] Claims no spiritual orientation:           [] Seeking spiritual identity:                [] Adheres to an individual form of spirituality:           [] Not able to assess:                           Identified resources for coping:      [x] Prayer                               [] Music                  [] Guided Imagery     [x] Family/friends                 [] Pet visits     [] Devotional reading                         [] Unknown     [] Other:                                                Interventions offered during this visit: (See comments for more details)    Patient Interventions: Affirmation of emotions/emotional suffering, Affirmation of dinesh, Catharsis/review of pertinent events in supportive environment, Coping skills reviewed/reinforced, Iconic (affirming the presence of God/Higher Power), Initial/Spiritual assessment, patient floor, Normalization of emotional/spiritual concerns, Prayer (assurance of), Episcopalian beliefs/image of God discussed           Plan of Care:     [x] Support spiritual and/or cultural needs    [] Support AMD and/or advance care planning process      [] Support grieving process   [] Coordinate Rites and/or Rituals    [] Coordination with community clergy   [] No spiritual needs identified at this time   [] Detailed Plan of Care below (See Comments) [] Make referral to Music Therapy  [] Make referral to Pet Therapy     [] Make referral to Addiction services  [] Make referral to [facility]  [] Make referral to Spiritual Care Partner  [] No future visits requested        [x] Contact Spiritual Care for further referrals     Comments:  Reviewed chart prior to visit on [facility] unit for spiritual assessment. No family/friends present. Provided spiritual presence and supportive listening as patient spoke about coming to the realization of the life style changes he needs to make in order to have the life he wants with his wife and family. He shared that they are very supportive. Beata  particularly expressed the need to stopping drinking and smoking. Explored strategies that may help him succeed. He and his wife have a 15year old son and are expecting a baby in two months so he expresses his family is his strongest motivation; he wants to be here to see his children grow up. He shared that at the current time he has a very stressful job, working 7 days a week. Due to this, he has not attended Taoism in quite some time. His wife is very active at [email or web address]. His parents and her parents live locally. Offered words of encouragement, shared a scripture with him and offered assurance of prayer. Advised of ongoing availability of pastoral support.      Rosa Maria Eubanks, NEREIDA, [facility], Staff [address]    [address] Paging Service  [phone]

## 2022-08-17 NOTE — PROGRESS NOTES
Reason for Admission:  Cardiomegaly                     RUR Score:  5%                   Plan for utilizing home health:   Declines       PCP: First and Last name:  None     Name of Practice:    Are you a current patient: Yes/No:    Approximate date of last visit:    Can you participate in a virtual visit with your PCP:                     Current Advanced Directive/Advance Care Plan: Full Code  GAUDENCIO confirmed with patient's wife that he is a Full Code    Healthcare Decision Maker:   Click here to complete [address] including selection of the Healthcare Decision Maker Relationship (ie \"Primary\")           Wife, Annette Watters, [phone]                  Transition of Care Plan:                    CM spoke with patient's wife. She states that patient lives at home with her. There are no steps or ramp to enter the home. Patient has no DME, and has never had any HH, IRF or SNF services. Patient is independent in care. Patient's wife will be his ride home at discharge and patient can transport himself to follow up appointments. Patient uses the [address]. Current Dispo: Home/self.

## 2022-08-17 NOTE — PROGRESS NOTES
End of Shift Note    Bedside shift change report given to [address] [place] [address] [place], 39[address] Ellen Feliciano RN (oncoming nurse) by Leobardo Lauren RN (offgoing nurse). Report included the following information SBAR, Kardex, ED Summary, Intake/Output, MAR, and Recent Results    Shift worked:  night     Shift summary and any significant changes:     Pt rested comfortably     0630 Orders received for NPO and nuclear stress test, informed patient of NPO status     Concerns for physician to address:  none     Zone phone for oncoming shift:   xxx       Activity:  Activity Level: Up ad florencio  Number times ambulated in hallways past shift: 0  Number of times OOB to chair past shift: 0    Cardiac:   Cardiac Monitoring: Yes      Cardiac Rhythm: Sinus Rhythm    Access:  Current line(s): PIV     Genitourinary:   Urinary status: voiding    Respiratory:   O2 Device: Nasal cannula  Chronic home O2 use?: NO  Incentive spirometer at bedside: NO       GI:  Last Bowel Movement Date: 08/16/22  Current diet:  DIET NPO  Passing flatus: YES  Tolerating current diet: YES       Pain Management:   Patient states pain is manageable on current regimen: YES    Skin:  Jean Paul Score: 21  Interventions: increase time out of bed    Patient Safety:  Fall Score:  Total Score: 1  Interventions: gripper socks and pt to call before getting OOB       Length of Stay:  Expected LOS: 3d 0h  Actual LOS: 3811 Giuseppe Peoples RN

## 2022-08-17 NOTE — PROGRESS NOTES
[address] [facility] follow-up PCP transitional care appointment has been scheduled with Dr. Steve Disla on 9/23/22 at 21 [phone]. Pending patient discharge.   Gerry Gonzalez, Care Management Assistant

## 2022-08-17 NOTE — PROGRESS NOTES
Transition of Care Plan:    RUR: 5%  Disposition: Home with Follow-up appointment  Follow up appointments: New PCP. DME needed: None  Transportation at Discharge: Pt's wife will transport at d/c  [place] or means to access home:  Pt has access       Medicare Letter: Not needed  Is patient a  and connected with the [address] ? No              If yes, was [employer] transfer form completed and [facility] notified? N/A  Caregiver Contact: Yrn Hicks- Wife [phone]  Discharge Caregiver contacted prior to discharge? If requested  Care Conference needed?:    No    10:30am- CM sent clinicals and order to [facility] for life vest via fax. CM will continue to follow patient for discharge planning needs and arrange for services as deemed necessary.     Raymundo Rubio [address] [place] [address]  [phone]

## 2022-08-17 NOTE — PROGRESS NOTES
[facility]  [address]  [phone]  [place] [facility]  CARDIOLOGY PROGRESS NOTE    8/17/2022 8:19 AM    Admit Date: 8/13/2022    Admit Diagnosis:   Bilateral leg edema [R60.0]  Cardiomegaly [I51.7]  Acute CHF (congestive heart failure) (HCC) [I50.9]  Scrotal edema [N50.89]    Subjective: Nelly Lazcano reports feeling well this AM, no orthopnea.      Visit Vitals  /80   Pulse 87   Temp 98.2 °F (36.8 °C)   Resp 18   Ht 6' 3\" (1.905 m)   Wt 146.5 kg (322 lb 15.6 oz)   SpO2 92%   BMI 40.37 kg/m²       Current Facility-Administered Medications   Medication Dose Route Frequency    Vancomycin trough 8/17 @ 0800   Other ONCE    furosemide (LASIX) injection 40 mg  40 mg IntraVENous DAILY    metoprolol succinate (TOPROL-XL) XL tablet 50 mg  50 mg Oral DAILY    [START ON 8/18/2022] sacubitriL-valsartan (ENTRESTO) 24-26 mg tablet 1 Tablet  1 Tablet Oral Q12H    empagliflozin (JARDIANCE) tablet 10 mg  10 mg Oral DAILY    spironolactone (ALDACTONE) tablet 25 mg  25 mg Oral DAILY    potassium chloride (K-DUR, KLOR-CON M20) SR tablet 40 mEq  40 mEq Oral DAILY    nicotine (NICODERM CQ) 14 mg/24 hr patch 1 Patch  1 Patch TransDERmal DAILY    cefTRIAXone (ROCEPHIN) 1 g in 0.9% sodium chloride (MBP/ADV) 50 mL MBP  1 g IntraVENous Q24H    diphenhydrAMINE (BENADRYL) 12.5 mg/5 mL oral liquid 25 mg  25 mg Oral Q6H PRN    aspirin chewable tablet 81 mg  81 mg Oral DAILY    sodium chloride (NS) flush 5-40 mL  5-40 mL IntraVENous Q8H    sodium chloride (NS) flush 5-40 mL  5-40 mL IntraVENous PRN    acetaminophen (TYLENOL) tablet 650 mg  650 mg Oral Q6H PRN    Or    acetaminophen (TYLENOL) suppository 650 mg  650 mg Rectal Q6H PRN    polyethylene glycol (MIRALAX) packet 17 g  17 g Oral DAILY PRN    ondansetron (ZOFRAN ODT) tablet 4 mg  4 mg Oral Q8H PRN    Or    ondansetron (ZOFRAN) injection 4 mg  4 mg IntraVENous Q6H PRN    enoxaparin (LOVENOX) injection 40 mg  40 mg SubCUTAneous BID    diazePAM (VALIUM) tablet 10 mg  10 mg Oral Q1H PRN    diazePAM (VALIUM) tablet 20 mg  20 mg Oral K1U PRN    folic acid (FOLVITE) tablet 1 mg  1 mg Oral DAILY    thiamine mononitrate (B-1) tablet 100 mg  100 mg Oral DAILY    multivitamin, tx-iron-ca-min (THERA-M w/ IRON) tablet 1 Tablet  1 Tablet Oral DAILY    vancomycin (VANCOCIN) 1,000 mg in 0.9% sodium chloride 250 mL ([id number])  1,000 mg IntraVENous Q8H         Objective:      Physical Exam    General: Well developed, in no acute distress, cooperative and alert  HEENT: No carotid bruits, no JVD, trach is midline. Neck Supple, PERRL, EOM intact. Heart:  Normal S1/S2 negative S3 or S4. Regular, no murmur, gallop or rub. Respiratory: Clear bilaterally x 4, no wheezing or rales  Abdomen:   Soft, non-tender, no masses, bowel sounds are active. Extremities: 1+ pitting LE edema . Neuro: A&Ox3, speech clear,       Data Review:   Recent Labs     08/17/22  0227 08/15/22  0204   WBC 7.1 7.6   HGB 14.7 14.2   HCT 47.6 47.1    213       Recent Labs     08/17/22 0227 08/16/22  0232 08/15/22  0220 08/15/22  0204    136  --  139   K 3.8 3.7  --  3.6   CL 95* 93*  --  96*   CO2 39* 41*  --  38*   * 112*  --  119*   BUN 15 17  --  14   CREA 0.87 0.93  --  0.95   CA 9.8 9.5  --  9.0   MG  --   --  2.0  --    INR 1.1  --   --   --          No results for input(s): TROIQ, CPK, CKMB in the last 72 hours. Intake/Output Summary (Last 24 hours) at 8/17/2022 9091  Last data filed at 8/16/2022 1900  Gross per 24 hour   Intake 550 ml   Output 4230 ml   Net -3680 ml          Telemetry: NSR  EKG:  Cxray:  ECHO   Left Ventricle: Severely reduced left ventricular systolic function with a visually estimated EF of 25 - 30%. Left ventricle is dilated. Increased wall thickness. Findings consistent with mild concentric hypertrophy. There are regional wall motion abnormalities. Right Ventricle: Right ventricle is mildly dilated. Reduced systolic function. Mitral Valve: Mildly thickened leaflet. Mild regurgitation. Mild stenosis noted. Left Atrium: Left atrium is dilated. Pericardium: Trivial pericardial effusion present. No indication of cardiac tamponade. Technical qualifiers: Echo study was technically difficult due to patient's body habitus. Assessment:     Active Problems:    Cardiomegaly (8/13/2022)      Scrotal edema (8/13/2022)      Bilateral leg edema (8/13/2022)      Acute CHF (congestive heart failure) ([facility]) (8/13/2022)      Plan:     Systolic Heart Failure: EF 25-30%. Started on GDMT. Toprol/Spironolactone/Jardiance continue lasix 40mg daily, entresto to start tomorrow after ACEI washout. Will perform CHELI NST to R/O ischemia. Life vest ordered    PVC/nsvt: Increase toprol to 50mg  keep K+4.0, magnesium 2.0     3. pHTN: Seen on CT, recommend out patient ROSEMARIE evaluation     4. Cirrhosis: Appreciate GI consult, started on spironolactone. [address] for today/tomorrow. CHF stabilizing.    Andriy Williamson NP

## 2022-08-17 NOTE — PROGRESS NOTES
Hospitalist Progress Note    NAME: Marquise Snyder   :  1985   MRN:  [id number]       Assessment / Plan:  New onset acute systolic CHF POA LVEF 25 to 30%  Cirrhosis POA  Anasarca  Acute hypoxic respiratory failure POA remains on 1 liter  Reduced RV function POA  Elevated HS troponin 101  SOB with bilateral tense leg edema, abdominal wall and scrotal edema Weight gain of 30 Lbs. over 3 weeks  pBNP 4509  Echo LVEF 25-30%, reduced RV function  TSH 2.49  D-dimer elevated but CTA negative         Bibasilar atelectasis  UDS negative  Diuresed with lasix, improved SOB and edema       Remains on 1 liter NC  Aldactone added  Stopped lisinopril, Start entresto on Thursday   Cont Metoprolol   SGLT2 added by Cardiology  Needs life vest prior to DC  Counseled re abstinence from alcohol       Discussed and reinforced importance of not drinking  Sleep study as outpt. GI consulted for cirrhosis       Hepatology appointment on DC  Stress test today  Wean o2  OOB     Elevated blood pressures likely HTN POA  NSVT POA  BP was elevated on admission, now controlled    Bilateral leg cellulitis and abdominal wall cellulitis POA  Sinus tachycardia  Blood Culture negative to date  MRSA nasal swab  C/w  rocephin and vancomycin  Lower extremity Doppler negative for DVT  This is most likely bilateral venous stasis      Procalcitonin, if negative, will stop antibiotics    Excessive alcohol use  Tobacco abuse  drinks 20-25 shots of bourbon or pints of beer a week  monitor for withdrawal, no evidence after 4 days  MVI, thiamine, folic acid. CIWA with prn oral valium  Patient counseled about cessation, will order nicotine patch     Obesity POA Body mass index is 39.68 kg/m².     Pre DM HgBa1c 6.1  Diet modification   Jardiance started by Cardio     Code: full  DVT prophylaxis: lovenox  Surrogate decision maker:  wife Lucia Aguilar [phone]    Code status: Full  Prophylaxis: Lovenox  Recommended Disposition: Home w/Family     Subjective: Chief Complaint / Reason for Physician Visit  Fu new onset CHF . Discussed with RN events overnight. \"I feel a lot less swollen\"  Less SOB, overall feels 90% better  Still on 1 liter NC  No CP  Able to lay flat  No fever  Awaiting stress test today    Review of Systems:  Symptom Y/N Comments  Symptom Y/N Comments   Fever/Chills n   Chest Pain n    Poor Appetite    Edema     Cough n   Abdominal Pain n    Sputum    Joint Pain     SOB/MORENO y   Pruritis/Rash     Nausea/vomit n   Tolerating PT/OT     Diarrhea n   Tolerating Diet y    Constipation    Other       Could NOT obtain due to:      Objective:     VITALS:   Last 24hrs VS reviewed since prior progress note. Most recent are:  Patient Vitals for the past 24 hrs:   Temp Pulse Resp BP SpO2   08/17/22 1457 97.5 °F (36.4 °C) 90 18 (!) 100/56 93 %   08/17/22 1148 -- -- -- -- 94 %   08/17/22 1142 -- 89 -- -- 93 %   08/17/22 1126 -- 92 -- -- 95 %   08/17/22 1125 98 °F (36.7 °C) 90 18 122/80 95 %   08/17/22 0740 98 °F (36.7 °C) 88 19 110/83 93 %   08/17/22 0328 98.2 °F (36.8 °C) 87 18 103/80 92 %   08/16/22 2238 98.7 °F (37.1 °C) 91 -- 103/67 96 %   08/16/22 2114 98.3 °F (36.8 °C) 85 20 120/71 94 %         Intake/Output Summary (Last 24 hours) at 8/17/2022 1606  Last data filed at 8/17/2022 1147  Gross per 24 hour   Intake 540 ml   Output 2685 ml   Net -2145 ml          I had a face to face encounter and independently examined this patient on 8/17/2022, as outlined below:  PHYSICAL EXAM:  General: WD, WN. Alert, cooperative, no acute distress    EENT:  Anicteric sclerae. MMM  Resp:  Decreased BS at bases, no wheezing. Few rales. No accessory muscle use  CV:  Regular  rhythm,  1+ edema, scrotum with mild swelling  GI:  Soft, Non distended, Non tender. +Bowel sounds  Neurologic:  Alert and oriented X 3, normal speech,   Psych:   Good insight. Not anxious nor agitated  Skin:  minimal erythema of LE and abd wall.   No jaundice    Reviewed most current lab test results and cultures  YES  Reviewed most current radiology test results   YES  Review and summation of old records today    NO  Reviewed patient's current orders and MAR    YES  PMH/SH reviewed - no change compared to H&P  ________________________________________________________________________  Care Plan discussed with:    Comments   Patient x    Family      RN x    Care Manager x    Consultant                       x Multidiciplinary team rounds were held today with , nursing, pharmacist and clinical coordinator. Patient's plan of care was discussed; medications were reviewed and discharge planning was addressed. ________________________________________________________________________  Total NON critical care TIME:  40  Minutes    Total CRITICAL CARE TIME Spent:   Minutes non procedure based      Comments   >50% of visit spent in counseling and coordination of care     ________________________________________________________________________  Andrew Gonzalez MD     Procedures: see electronic medical records for all procedures/Xrays and details which were not copied into this note but were reviewed prior to creation of Plan. LABS:  I reviewed today's most current labs and imaging studies.   Pertinent labs include:  Recent Labs     08/17/22  0227 08/15/22  0204   WBC 7.1 7.6   HGB 14.7 14.2   HCT 47.6 47.1    213       Recent Labs     08/17/22  1138 08/17/22 0227 08/16/22  0232 08/15/22  0220 08/15/22  0204   NA  --  136 136  --  139   K  --  3.8 3.7  --  3.6   CL  --  95* 93*  --  96*   CO2  --  39* 41*  --  38*   GLU  --  104* 112*  --  119*   BUN  --  15 17  --  14   CREA  --  0.87 0.93  --  0.95   CA  --  9.8 9.5  --  9.0   MG  --   --   --  2.0  --    ALB 3.4*  --   --   --   --    TBILI 0.8  --   --   --   --    ALT 53  --   --   --   --    INR  --  1.1  --   --   --          Signed: Andrew Gonzalez MD

## 2022-08-17 NOTE — PROGRESS NOTES
2331 - Bedside shift change report given to Lincoln Cassidy RN and Rosita Marx RN (oncoming nurse) by Vira Augirre RN (offgoing nurse). Report included the following information SBAR, Intake/Output, MAR, Recent Results, and Cardiac Rhythm SR/ST with PVCs .     0900 -   TRANSFER - OUT REPORT:    Verbal report given to Andrew Billings RN on Felicia Ohara  being transferred to [facility] for ordered procedure       Report consisted of patients Situation, Background, Assessment and   Recommendations(SBAR). Information from the following report(s) SBAR, Intake/Output, MAR, Recent Results, and Cardiac Rhythm SR/ST with PVCs  was reviewed with the receiving nurse. Lines:   Peripheral IV 08/14/22 Distal;Left Antecubital (Active)   Site Assessment Clean, dry, & intact 08/17/22 0740   Phlebitis Assessment 0 08/17/22 0740   Infiltration Assessment 0 08/17/22 0740   Dressing Status Clean, dry, & intact 08/17/22 0740   Dressing Type Tape;Transparent 08/17/22 0740   Hub Color/Line Status Pink;Flushed 08/17/22 0740   Alcohol Cap Used Yes 08/16/22 0758        Opportunity for questions and clarification was provided. Patient transported with:   Monitor  O2 @ 1 liters      Per Andrew Billings RN hold AM medications      0930 - Patient off the floor for echo    1100 - Vancomycin trough and vancomycin due, will obtain labs and give medication once patient is back. 1130 - Patient back from echo      End of Shift Note    Bedside shift change report given to Adrian An RN  (oncoming nurse) by Sue Baldwin RN (offgoing nurse).   Report included the following information SBAR, Intake/Output, MAR, Recent Results, and Cardiac Rhythm SR, ST, PVCs    Shift worked:  Day     Shift summary and any significant changes:      - Oxygen discontinued   - CIWA discontinued   - Seizure precautions discontinued     Concerns for physician to address:  None     Zone phone for oncoming shift:   [phone]       Activity:  Activity Level: Up ad florencio  Number times ambulated in hallways past shift: 0  Number of times OOB to chair past shift: 4    Cardiac:   Cardiac Monitoring: Yes      Cardiac Rhythm: Sinus Rhythm, Sinus Tachy, PVC    Access:  Current line(s): PIV     Genitourinary:   Urinary status: voiding    Respiratory:   O2 Device: None (Room air)  Chronic home O2 use?: NO  Incentive spirometer at bedside: NO       GI:  Last Bowel Movement Date: 08/16/22  Current diet:  ADULT DIET Regular; Low Fat/Low Chol/High Fiber/2 gm Na  Passing flatus: YES  Tolerating current diet: YES       Pain Management:   Patient states pain is manageable on current regimen: YES    Skin:  Jean Paul Score: 21  Interventions: float heels, increase time out of bed, PT/OT consult, and nutritional support     Patient Safety:  Fall Score:  Total Score: 1  Interventions: bed/chair alarm, assistive device (walker, cane, etc), gripper socks, and pt to call before getting OOB       Length of Stay:  Expected LOS: 3d 19h  Actual LOS: MECHELLE Christianson

## 2022-08-17 NOTE — PROGRESS NOTES
[facility] MED: 2 Day LEXISCAN Cardiac Stress study in progress. Lexiscan stress done today. The pt will have the resting study in the morning. Please check with ordering Cardiologist regarding pt diet. Pt does not need to be NPO for the rest study in the morning.

## 2022-08-17 NOTE — PROGRESS NOTES
Pharmacy Antimicrobial Kinetic Dosing    Indication for Antimicrobials: Cellulitis      Current Regimen of Each Antimicrobial:  Vancomycin 1000 mg IV q8h (Start Date ; Day 5)  Ceftriaxone 1 g IV q24h (Start Date ; Day 5)    Previous Antimicrobial Therapy:    Goal Level: AUC: 400-600 mg/hr/Liter/day    Date Dose & Interval Measured (mcg/mL) Predicted AUC/GARY   8/15 1000 mg q8h 11.9 426    1000 mg Q8H 10.7 418           Date & time of next level:     Dosing calculator used: [employer] calculator    Significant Positive Cultures:    blood: NGTD      Conditions for Dosing Consideration: OBESE BMI 46.02; Wt verified with ER RN  ED notes indicates patient has gained 30 lbs in 3 weeks. Labs:  Recent Labs     22  0227 22  0232 08/15/22  0204   CREA 0.87 0.93 0.95   BUN 15 17 14     Recent Labs     22  0227 08/15/22  0204   WBC 7.1 7.6     Temp (24hrs), Av.3 °F (36.8 °C), Min:98 °F (36.7 °C), Max:98.7 °F (37.1 °C)    Creatinine Clearance (mL/min):   CrCl (Ideal Body Weight): 137.6   If actual weight < IBW: CrCl (Actual Body Weight) 234.5    Impression/Plan:   Vancomycin 1000 mg Q8H  trough 10.7 mcg/ml. Continue vancomycin 1000 mg q8h. Vancomycin trough  @ 0800  Continue Ceftriaxone 1 gm Q24H  Antimicrobial stop date Ceftriaxone 7 days; Vanc TBD      Pharmacy will follow daily and adjust medications as appropriate for renal function and/or serum levels.     Thank you,  Katiana Nava, [facility]    Vancomycin Dosing Document    Documents located on pharmacy Teams site: Clinical Practice -> Antimicrobial Stewardship -> Antibiotics_Vancomycin     Aminoglycoside Dosing Document    Documents located on pharmacy Teams site: Clinical Practice -> Antimicrobial Stewardship -> Antibiotics_Aminoglycosides

## 2022-08-17 NOTE — PROGRESS NOTES
Problem: Falls - Risk of  Goal: *Absence of Falls  Description: Document Erica Shelton Fall Risk and appropriate interventions in the flowsheet.   Outcome: Progressing Towards Goal  Note: Fall Risk Interventions:            Medication Interventions: Bed/chair exit alarm, Patient to call before getting OOB, Teach patient to arise slowly, Utilize gait belt for transfers/ambulation         History of Falls Interventions: Bed/chair exit alarm, Room close to nurse's station, Utilize gait belt for transfer/ambulation         Problem: Patient Education: Go to Patient Education Activity  Goal: Patient/Family Education  Outcome: Progressing Towards Goal     Problem: General Medical Care Plan  Goal: *Vital signs within specified parameters  Outcome: Progressing Towards Goal  Goal: *Labs within defined limits  Outcome: Progressing Towards Goal  Goal: *Absence of infection signs and symptoms  Outcome: Progressing Towards Goal  Goal: *Optimal pain control at patient's stated goal  Outcome: Progressing Towards Goal  Goal: *Skin integrity maintained  Outcome: Progressing Towards Goal  Goal: *Fluid volume balance  Outcome: Progressing Towards Goal  Goal: *Optimize nutritional status  Outcome: Progressing Towards Goal  Goal: *Anxiety reduced or absent  Outcome: Progressing Towards Goal  Goal: *Progressive mobility and function (eg: ADL's)  Outcome: Progressing Towards Goal     Problem: Patient Education: Go to Patient Education Activity  Goal: Patient/Family Education  Outcome: Progressing Towards Goal     Problem: Patient Education: Go to Patient Education Activity  Goal: Patient/Family Education  Outcome: Progressing Towards Goal     Problem: Heart Failure: Day 1  Goal: Off Pathway (Use only if patient is Off Pathway)  Outcome: Progressing Towards Goal  Goal: Activity/Safety  Outcome: Progressing Towards Goal  Goal: Consults, if ordered  Outcome: Progressing Towards Goal  Goal: Diagnostic Test/Procedures  Outcome: Progressing Towards Goal  Goal: Nutrition/Diet  Outcome: Progressing Towards Goal  Goal: Discharge Planning  Outcome: Progressing Towards Goal  Goal: Medications  Outcome: Progressing Towards Goal  Goal: Respiratory  Outcome: Progressing Towards Goal  Goal: Treatments/Interventions/Procedures  Outcome: Progressing Towards Goal  Goal: Psychosocial  Outcome: Progressing Towards Goal  Goal: *Oxygen saturation within defined limits  Outcome: Progressing Towards Goal  Goal: *Hemodynamically stable  Outcome: Progressing Towards Goal  Goal: *Optimal pain control at patient's stated goal  Outcome: Progressing Towards Goal  Goal: *Anxiety reduced or absent  Outcome: Progressing Towards Goal     Problem: Heart Failure: Day 2  Goal: Off Pathway (Use only if patient is Off Pathway)  Outcome: Progressing Towards Goal  Goal: Activity/Safety  Outcome: Progressing Towards Goal  Goal: Consults, if ordered  Outcome: Progressing Towards Goal  Goal: Diagnostic Test/Procedures  Outcome: Progressing Towards Goal  Goal: Nutrition/Diet  Outcome: Progressing Towards Goal  Goal: Discharge Planning  Outcome: Progressing Towards Goal  Goal: Medications  Outcome: Progressing Towards Goal  Goal: Respiratory  Outcome: Progressing Towards Goal  Goal: Treatments/Interventions/Procedures  Outcome: Progressing Towards Goal  Goal: Psychosocial  Outcome: Progressing Towards Goal  Goal: *Oxygen saturation within defined limits  Outcome: Progressing Towards Goal  Goal: *Hemodynamically stable  Outcome: Progressing Towards Goal  Goal: *Optimal pain control at patient's stated goal  Outcome: Progressing Towards Goal  Goal: *Anxiety reduced or absent  Outcome: Progressing Towards Goal  Goal: *Demonstrates progressive activity  Outcome: Progressing Towards Goal     Problem: Heart Failure: Day 3  Goal: Off Pathway (Use only if patient is Off Pathway)  Outcome: Progressing Towards Goal  Goal: Activity/Safety  Outcome: Progressing Towards Goal  Goal: Diagnostic Test/Procedures  Outcome: Progressing Towards Goal  Goal: Nutrition/Diet  Outcome: Progressing Towards Goal  Goal: Discharge Planning  Outcome: Progressing Towards Goal  Goal: Medications  Outcome: Progressing Towards Goal  Goal: Respiratory  Outcome: Progressing Towards Goal  Goal: Treatments/Interventions/Procedures  Outcome: Progressing Towards Goal  Goal: Psychosocial  Outcome: Progressing Towards Goal  Goal: *Oxygen saturation within defined limits  Outcome: Progressing Towards Goal  Goal: *Hemodynamically stable  Outcome: Progressing Towards Goal  Goal: *Optimal pain control at patient's stated goal  Outcome: Progressing Towards Goal  Goal: *Anxiety reduced or absent  Outcome: Progressing Towards Goal  Goal: *Demonstrates progressive activity  Outcome: Progressing Towards Goal     Problem: Heart Failure: Day 4  Goal: Off Pathway (Use only if patient is Off Pathway)  Outcome: Progressing Towards Goal  Goal: Activity/Safety  Outcome: Progressing Towards Goal  Goal: Diagnostic Test/Procedures  Outcome: Progressing Towards Goal  Goal: Nutrition/Diet  Outcome: Progressing Towards Goal  Goal: Discharge Planning  Outcome: Progressing Towards Goal  Goal: Medications  Outcome: Progressing Towards Goal  Goal: Respiratory  Outcome: Progressing Towards Goal  Goal: Treatments/Interventions/Procedures  Outcome: Progressing Towards Goal  Goal: Psychosocial  Outcome: Progressing Towards Goal  Goal: *Oxygen saturation within defined limits  Outcome: Progressing Towards Goal  Goal: *Hemodynamically stable  Outcome: Progressing Towards Goal  Goal: *Optimal pain control at patient's stated goal  Outcome: Progressing Towards Goal  Goal: *Anxiety reduced or absent  Outcome: Progressing Towards Goal  Goal: *Demonstrates progressive activity  Outcome: Progressing Towards Goal

## 2022-08-18 ENCOUNTER — APPOINTMENT (OUTPATIENT)
Dept: NUCLEAR MEDICINE | Age: 37
DRG: 291 | End: 2022-08-18
Attending: NURSE PRACTITIONER
Payer: COMMERCIAL

## 2022-08-18 LAB
ANA SER QL: NEGATIVE
ANION GAP SERPL CALC-SCNC: 6 MMOL/L (ref 5–15)
BACTERIA SPEC CULT: NORMAL
BASOPHILS # BLD: 0.1 K/UL (ref 0–0.1)
BASOPHILS NFR BLD: 1 % (ref 0–1)
BUN SERPL-MCNC: 19 MG/DL (ref 6–20)
BUN/CREAT SERPL: 21 (ref 12–20)
CALCIUM SERPL-MCNC: 9.3 MG/DL (ref 8.5–10.1)
CERULOPLASMIN SERPL-MCNC: 35.3 MG/DL (ref 16–31)
CHLORIDE SERPL-SCNC: 97 MMOL/L (ref 97–108)
CO2 SERPL-SCNC: 31 MMOL/L (ref 21–32)
CREAT SERPL-MCNC: 0.92 MG/DL (ref 0.7–1.3)
DIFFERENTIAL METHOD BLD: NORMAL
EOSINOPHIL # BLD: 0.4 K/UL (ref 0–0.4)
EOSINOPHIL NFR BLD: 6 % (ref 0–7)
ERYTHROCYTE [DISTWIDTH] IN BLOOD BY AUTOMATED COUNT: 14.4 % (ref 11.5–14.5)
GLUCOSE SERPL-MCNC: 113 MG/DL (ref 65–100)
HCT VFR BLD AUTO: 47.4 % (ref 36.6–50.3)
HGB BLD-MCNC: 14.6 G/DL (ref 12.1–17)
IMM GRANULOCYTES # BLD AUTO: 0 K/UL (ref 0–0.04)
IMM GRANULOCYTES NFR BLD AUTO: 0 % (ref 0–0.5)
LYMPHOCYTES # BLD: 1.8 K/UL (ref 0.8–3.5)
LYMPHOCYTES NFR BLD: 26 % (ref 12–49)
MAGNESIUM SERPL-MCNC: 2.3 MG/DL (ref 1.6–2.4)
MCH RBC QN AUTO: 27.9 PG (ref 26–34)
MCHC RBC AUTO-ENTMCNC: 30.8 G/DL (ref 30–36.5)
MCV RBC AUTO: 90.6 FL (ref 80–99)
MITOCHONDRIA M2 IGG SER-ACNC: <20 UNITS (ref 0–20)
MONOCYTES # BLD: 0.8 K/UL (ref 0–1)
MONOCYTES NFR BLD: 11 % (ref 5–13)
NEUTS SEG # BLD: 3.8 K/UL (ref 1.8–8)
NEUTS SEG NFR BLD: 56 % (ref 32–75)
NRBC # BLD: 0 K/UL (ref 0–0.01)
NRBC BLD-RTO: 0 PER 100 WBC
PLATELET # BLD AUTO: 192 K/UL (ref 150–400)
PMV BLD AUTO: 11.1 FL (ref 8.9–12.9)
POTASSIUM SERPL-SCNC: 4.2 MMOL/L (ref 3.5–5.1)
PROCALCITONIN SERPL-MCNC: <0.05 NG/ML
RBC # BLD AUTO: 5.23 M/UL (ref 4.1–5.7)
SERVICE CMNT-IMP: NORMAL
SMA IGG SER-ACNC: 6 UNITS (ref 0–19)
SODIUM SERPL-SCNC: 134 MMOL/L (ref 136–145)
STRESS BASELINE DIAS BP: 75 MMHG
STRESS BASELINE HR: 85 BPM
STRESS BASELINE ST DEPRESSION: 0 MM
STRESS BASELINE SYS BP: 114 MMHG
STRESS ESTIMATED WORKLOAD: 1 METS
STRESS EXERCISE DUR MIN: 1 MIN
STRESS EXERCISE DUR SEC: 13 SEC
STRESS O2 SAT PEAK: 95 %
STRESS O2 SAT REST: 95 %
STRESS PEAK DIAS BP: 87 MMHG
STRESS PEAK SYS BP: 136 MMHG
STRESS PERCENT HR ACHIEVED: 56 %
STRESS POST PEAK HR: 102 BPM
STRESS RATE PRESSURE PRODUCT: NORMAL BPM*MMHG
STRESS ST DEPRESSION: 0 MM
STRESS TARGET HR: 182 BPM
WBC # BLD AUTO: 6.8 K/UL (ref 4.1–11.1)

## 2022-08-18 PROCEDURE — 74011250637 HC RX REV CODE- 250/637: Performed by: HOSPITALIST

## 2022-08-18 PROCEDURE — 80048 BASIC METABOLIC PNL TOTAL CA: CPT

## 2022-08-18 PROCEDURE — 94760 N-INVAS EAR/PLS OXIMETRY 1: CPT

## 2022-08-18 PROCEDURE — 74011250637 HC RX REV CODE- 250/637: Performed by: NURSE PRACTITIONER

## 2022-08-18 PROCEDURE — 85025 COMPLETE CBC W/AUTO DIFF WBC: CPT

## 2022-08-18 PROCEDURE — 74011000250 HC RX REV CODE- 250: Performed by: HOSPITALIST

## 2022-08-18 PROCEDURE — 74011250637 HC RX REV CODE- 250/637: Performed by: INTERNAL MEDICINE

## 2022-08-18 PROCEDURE — 74011000258 HC RX REV CODE- 258: Performed by: HOSPITALIST

## 2022-08-18 PROCEDURE — 84145 PROCALCITONIN (PCT): CPT

## 2022-08-18 PROCEDURE — 36415 COLL VENOUS BLD VENIPUNCTURE: CPT

## 2022-08-18 PROCEDURE — 74011250636 HC RX REV CODE- 250/636: Performed by: HOSPITALIST

## 2022-08-18 PROCEDURE — 65270000046 HC RM TELEMETRY

## 2022-08-18 PROCEDURE — 83735 ASSAY OF MAGNESIUM: CPT

## 2022-08-18 RX ORDER — FUROSEMIDE 40 MG/1
40 TABLET ORAL DAILY
Status: DISCONTINUED | OUTPATIENT
Start: 2022-08-18 | End: 2022-08-19 | Stop reason: HOSPADM

## 2022-08-18 RX ORDER — TETRAKIS(2-METHOXYISOBUTYLISOCYANIDE)COPPER(I) TETRAFLUOROBORATE 1 MG/ML
32.3 INJECTION, POWDER, LYOPHILIZED, FOR SOLUTION INTRAVENOUS
Status: COMPLETED | OUTPATIENT
Start: 2022-08-18 | End: 2022-08-18

## 2022-08-18 RX ADMIN — VANCOMYCIN HYDROCHLORIDE 1000 MG: 1 INJECTION, POWDER, LYOPHILIZED, FOR SOLUTION INTRAVENOUS at 04:09

## 2022-08-18 RX ADMIN — POTASSIUM CHLORIDE 40 MEQ: 20 TABLET, EXTENDED RELEASE ORAL at 09:29

## 2022-08-18 RX ADMIN — SODIUM CHLORIDE, PRESERVATIVE FREE 10 ML: 5 INJECTION INTRAVENOUS at 00:01

## 2022-08-18 RX ADMIN — SODIUM CHLORIDE, PRESERVATIVE FREE 10 ML: 5 INJECTION INTRAVENOUS at 20:22

## 2022-08-18 RX ADMIN — METOPROLOL SUCCINATE 50 MG: 50 TABLET, EXTENDED RELEASE ORAL at 09:29

## 2022-08-18 RX ADMIN — SODIUM CHLORIDE 1 G: 900 INJECTION INTRAVENOUS at 11:40

## 2022-08-18 RX ADMIN — FUROSEMIDE 40 MG: 40 TABLET ORAL at 09:29

## 2022-08-18 RX ADMIN — ENOXAPARIN SODIUM 40 MG: 100 INJECTION SUBCUTANEOUS at 09:29

## 2022-08-18 RX ADMIN — SODIUM CHLORIDE, PRESERVATIVE FREE 10 ML: 5 INJECTION INTRAVENOUS at 13:16

## 2022-08-18 RX ADMIN — FOLIC ACID 1 MG: 1 TABLET ORAL at 09:29

## 2022-08-18 RX ADMIN — Medication 1 TABLET: at 09:29

## 2022-08-18 RX ADMIN — ASPIRIN 81 MG: 81 TABLET, CHEWABLE ORAL at 09:29

## 2022-08-18 RX ADMIN — Medication 100 MG: at 09:29

## 2022-08-18 RX ADMIN — SACUBITRIL AND VALSARTAN 1 TABLET: 24; 26 TABLET, FILM COATED ORAL at 20:21

## 2022-08-18 RX ADMIN — SPIRONOLACTONE 25 MG: 25 TABLET ORAL at 09:29

## 2022-08-18 RX ADMIN — VANCOMYCIN HYDROCHLORIDE 1000 MG: 1 INJECTION, POWDER, LYOPHILIZED, FOR SOLUTION INTRAVENOUS at 13:11

## 2022-08-18 RX ADMIN — TETRAKIS(2-METHOXYISOBUTYLISOCYANIDE)COPPER(I) TETRAFLUOROBORATE 32.3 MILLICURIE: 1 INJECTION, POWDER, LYOPHILIZED, FOR SOLUTION INTRAVENOUS at 07:45

## 2022-08-18 RX ADMIN — SODIUM CHLORIDE, PRESERVATIVE FREE 10 ML: 5 INJECTION INTRAVENOUS at 05:48

## 2022-08-18 RX ADMIN — SACUBITRIL AND VALSARTAN 1 TABLET: 24; 26 TABLET, FILM COATED ORAL at 09:29

## 2022-08-18 RX ADMIN — EMPAGLIFLOZIN 10 MG: 10 TABLET, FILM COATED ORAL at 11:40

## 2022-08-18 NOTE — PROGRESS NOTES
Problem: Falls - Risk of  Goal: *Absence of Falls  Description: Document Sae العلي Fall Risk and appropriate interventions in the flowsheet.   Outcome: Progressing Towards Goal  Note: Fall Risk Interventions:            Medication Interventions: Teach patient to arise slowly         History of Falls Interventions: Bed/chair exit alarm, Investigate reason for fall, Room close to nurse's station, Utilize gait belt for transfer/ambulation         Problem: General Medical Care Plan  Goal: *Vital signs within specified parameters  Outcome: Progressing Towards Goal  Goal: *Labs within defined limits  Outcome: Progressing Towards Goal  Goal: *Absence of infection signs and symptoms  Outcome: Progressing Towards Goal  Goal: *Optimal pain control at patient's stated goal  Outcome: Progressing Towards Goal  Goal: *Skin integrity maintained  Outcome: Progressing Towards Goal  Goal: *Fluid volume balance  Outcome: Progressing Towards Goal  Goal: *Optimize nutritional status  Outcome: Progressing Towards Goal  Goal: *Anxiety reduced or absent  Outcome: Progressing Towards Goal  Goal: *Progressive mobility and function (eg: ADL's)  Outcome: Progressing Towards Goal

## 2022-08-18 NOTE — PROGRESS NOTES
Problem: Falls - Risk of  Goal: *Absence of Falls  Description: Document Juan Frankel Fall Risk and appropriate interventions in the flowsheet.   Outcome: Progressing Towards Goal  Note: Fall Risk Interventions:            Medication Interventions: Bed/chair exit alarm, Patient to call before getting OOB, Teach patient to arise slowly         History of Falls Interventions: Bed/chair exit alarm, Room close to nurse's station         Problem: Patient Education: Go to Patient Education Activity  Goal: Patient/Family Education  Outcome: Progressing Towards Goal     Problem: General Medical Care Plan  Goal: *Vital signs within specified parameters  Outcome: Progressing Towards Goal  Goal: *Labs within defined limits  Outcome: Progressing Towards Goal  Goal: *Absence of infection signs and symptoms  Outcome: Progressing Towards Goal  Goal: *Optimal pain control at patient's stated goal  Outcome: Progressing Towards Goal  Goal: *Skin integrity maintained  Outcome: Progressing Towards Goal  Goal: *Fluid volume balance  Outcome: Progressing Towards Goal  Goal: *Optimize nutritional status  Outcome: Progressing Towards Goal  Goal: *Anxiety reduced or absent  Outcome: Progressing Towards Goal  Goal: *Progressive mobility and function (eg: ADL's)  Outcome: Progressing Towards Goal     Problem: Patient Education: Go to Patient Education Activity  Goal: Patient/Family Education  Outcome: Progressing Towards Goal     Problem: Patient Education: Go to Patient Education Activity  Goal: Patient/Family Education  Outcome: Progressing Towards Goal

## 2022-08-18 NOTE — PROGRESS NOTES
End of Shift Note    Bedside shift change report given to Rupal Cruz RN (oncoming nurse) by Kira Hickman RN (offgoing nurse). Report included the following information SBAR, Kardex, ED Summary, Intake/Output, MAR, and Recent Results    Shift worked:  night     Shift summary and any significant changes:     Pt rested comfortably, remains on room air     Second part of stress test to be completed today     Concerns for physician to address:  none     Zone phone for oncoming shift:   xxx       Activity:  Activity Level: Up ad florencio  Number times ambulated in hallways past shift: 0  Number of times OOB to chair past shift: 0    Cardiac:   Cardiac Monitoring: Yes      Cardiac Rhythm: Sinus Rhythm    Access:  Current line(s): PIV     Genitourinary:   Urinary status: voiding    Respiratory:   O2 Device: None (Room air)  Chronic home O2 use?: NO  Incentive spirometer at bedside: NO       GI:  Last Bowel Movement Date: 08/16/22  Current diet:  ADULT DIET Regular; Low Fat/Low Chol/High Fiber/2 gm Na  Passing flatus: YES  Tolerating current diet: YES       Pain Management:   Patient states pain is manageable on current regimen: YES    Skin:  Jean Paul Score: 21  Interventions: increase time out of bed    Patient Safety:  Fall Score:  Total Score: 1  Interventions: gripper socks and pt to call before getting OOB       Length of Stay:  Expected LOS: 3d 19h  Actual LOS: [address], RN

## 2022-08-18 NOTE — PROGRESS NOTES
0700 - Bedside shift change report given to Ulises Colbert RN and Reji Miranda RN (oncoming nurse) by Skylar Kwon RN (offgoing nurse). Report included the following information SBAR, Kardex, and MAR.    0825 - Patient off the floor for part 2 for nuclear stress test     0915 - Patient back in the room.

## 2022-08-18 NOTE — PROGRESS NOTES
Pharmacy Antimicrobial Kinetic Dosing    Indication for Antimicrobials: Cellulitis      Current Regimen of Each Antimicrobial:  Vancomycin 1000 mg IV q8h (Start Date ; Day 6)  Ceftriaxone 1 g IV q24h (Start Date ; Day 6)    Previous Antimicrobial Therapy:    Goal Level: AUC: 400-600 mg/hr/Liter/day    Date Dose & Interval Measured (mcg/mL) Predicted AUC/GARY   8/15 1000 mg q8h 11.9 426    1000 mg Q8H 10.7 418           Date & time of next level: to be determined    Dosing calculator used: [facility] calculator    Significant Positive Cultures:    blood: NGTD      Conditions for Dosing Consideration: OBESE BMI 46.02; Wt verified with ER RN  ED notes indicates patient has gained 30 lbs in 3 weeks. Labs:  Recent Labs     22  023   CREA 0.92 0.87 0.93   BUN  15 17   PCT <0.05  --   --      Recent Labs     22   WBC 6.8 7.1     Temp (24hrs), Av.5 °F (36.4 °C), Min:96.9 °F (36.1 °C), Max:98.3 °F (36.8 °C)    Creatinine Clearance (mL/min):   CrCl (Ideal Body Weight): 130.1   If actual weight < IBW: CrCl (Actual Body Weight) 218.3    Impression/Plan:   Vancomycin 1000 mg Q8H  trough 10.7 mcg/ml. Continue vancomycin 1000 mg q8h. Consider level on  pending morning labs  Continue Ceftriaxone 1 gm Q24H  Antimicrobial stop date Ceftriaxone 7 days; Vanc TBD      Pharmacy will follow daily and adjust medications as appropriate for renal function and/or serum levels.     Thank you,  Roz Cruz PHARMD    Vancomycin Dosing Document    Documents located on pharmacy Teams site: Clinical Practice -> Antimicrobial Stewardship -> Antibiotics_Vancomycin     Aminoglycoside Dosing Document    Documents located on pharmacy Teams site: Clinical Practice -> Antimicrobial Stewardship -> Antibiotics_Aminoglycosides

## 2022-08-18 NOTE — PROGRESS NOTES
Problem: Falls - Risk of  Goal: *Absence of Falls  Description: Document Energy Fall Risk and appropriate interventions in the flowsheet.   8/18/2022 1621 by Liv Coleman RN  Outcome: Progressing Towards Goal  Note: Fall Risk Interventions:            Medication Interventions: Patient to call before getting OOB, Teach patient to arise slowly         History of Falls Interventions: Door open when patient unattended, Room close to nurse's station      8/18/2022 1619 by Liv Coleman RN  Outcome: Progressing Towards Goal  Note: Fall Risk Interventions:            Medication Interventions: Patient to call before getting OOB, Teach patient to arise slowly         History of Falls Interventions: Door open when patient unattended, Room close to nurse's station         Problem: Patient Education: Go to Patient Education Activity  Goal: Patient/Family Education  8/18/2022 1621 by Liv Coleman RN  Outcome: Progressing Towards Goal  8/18/2022 1619 by Liv Coleman RN  Outcome: Progressing Towards Goal     Problem: General Medical Care Plan  Goal: *Vital signs within specified parameters  8/18/2022 1621 by Liv Coleman RN  Outcome: Progressing Towards Goal  8/18/2022 1619 by Liv Coleman RN  Outcome: Progressing Towards Goal  Goal: *Labs within defined limits  8/18/2022 1621 by Liv Coleman RN  Outcome: Progressing Towards Goal  8/18/2022 1619 by Liv Coleman RN  Outcome: Progressing Towards Goal  Goal: *Absence of infection signs and symptoms  8/18/2022 1621 by Liv Coleman RN  Outcome: Progressing Towards Goal  8/18/2022 1619 by Liv Coleman RN  Outcome: Progressing Towards Goal  Goal: *Optimal pain control at patient's stated goal  8/18/2022 1621 by Liv Coleman RN  Outcome: Progressing Towards Goal  8/18/2022 1619 by Liv Coleman RN  Outcome: Progressing Towards Goal  Goal: *Skin integrity maintained  8/18/2022 1621 by Liv Coleman RN  Outcome: Progressing Towards Goal  8/18/2022 1619 by Melany Burch RN  Outcome: Progressing Towards Goal  Goal: *Fluid volume balance  8/18/2022 1621 by Melany Burch RN  Outcome: Progressing Towards Goal  8/18/2022 1619 by Melany Burch RN  Outcome: Progressing Towards Goal  Goal: *Optimize nutritional status  8/18/2022 1621 by Melany Burch RN  Outcome: Progressing Towards Goal  8/18/2022 1619 by Melany Burch RN  Outcome: Progressing Towards Goal  Goal: *Anxiety reduced or absent  8/18/2022 1621 by Melany Burch RN  Outcome: Progressing Towards Goal  8/18/2022 1619 by Melany Burch RN  Outcome: Progressing Towards Goal  Goal: *Progressive mobility and function (eg: ADL's)  8/18/2022 1621 by Melany Burch RN  Outcome: Progressing Towards Goal  8/18/2022 1619 by Melany Burch RN  Outcome: Progressing Towards Goal     Problem: Patient Education: Go to Patient Education Activity  Goal: Patient/Family Education  8/18/2022 1621 by Melany Burch RN  Outcome: Progressing Towards Goal  8/18/2022 1619 by Melany Burch RN  Outcome: Progressing Towards Goal     Problem: Patient Education: Go to Patient Education Activity  Goal: Patient/Family Education  8/18/2022 1621 by Melany Burch RN  Outcome: Progressing Towards Goal  8/18/2022 1619 by Melany Burch RN  Outcome: Progressing Towards Goal     Problem: Heart Failure: Day 1  Goal: Off Pathway (Use only if patient is Off Pathway)  Outcome: Resolved/Met  Goal: Activity/Safety  Outcome: Resolved/Met  Goal: Consults, if ordered  Outcome: Resolved/Met  Goal: Diagnostic Test/Procedures  Outcome: Resolved/Met  Goal: Nutrition/Diet  Outcome: Resolved/Met  Goal: Discharge Planning  Outcome: Resolved/Met  Goal: Medications  Outcome: Resolved/Met  Goal: Respiratory  Outcome: Resolved/Met  Goal: Treatments/Interventions/Procedures  Outcome: Resolved/Met  Goal: Psychosocial  Outcome: Resolved/Met  Goal: *Oxygen saturation within defined limits  Outcome: Resolved/Met  Goal: *Hemodynamically stable  Outcome: Resolved/Met  Goal: *Optimal pain control at patient's stated goal  Outcome: Resolved/Met  Goal: *Anxiety reduced or absent  Outcome: Resolved/Met     Problem: Heart Failure: Day 2  Goal: Off Pathway (Use only if patient is Off Pathway)  Outcome: Resolved/Met  Goal: Activity/Safety  Outcome: Resolved/Met  Goal: Consults, if ordered  Outcome: Resolved/Met  Goal: Diagnostic Test/Procedures  Outcome: Resolved/Met  Goal: Nutrition/Diet  Outcome: Resolved/Met  Goal: Discharge Planning  Outcome: Resolved/Met  Goal: Medications  Outcome: Resolved/Met  Goal: Respiratory  Outcome: Resolved/Met  Goal: Treatments/Interventions/Procedures  Outcome: Resolved/Met  Goal: Psychosocial  Outcome: Resolved/Met  Goal: *Oxygen saturation within defined limits  Outcome: Resolved/Met  Goal: *Hemodynamically stable  Outcome: Resolved/Met  Goal: *Optimal pain control at patient's stated goal  Outcome: Resolved/Met  Goal: *Anxiety reduced or absent  Outcome: Resolved/Met  Goal: *Demonstrates progressive activity  Outcome: Resolved/Met     Problem: Heart Failure: Day 3  Goal: Off Pathway (Use only if patient is Off Pathway)  Outcome: Resolved/Met  Goal: Activity/Safety  Outcome: Resolved/Met  Goal: Diagnostic Test/Procedures  Outcome: Resolved/Met  Goal: Nutrition/Diet  Outcome: Resolved/Met  Goal: Discharge Planning  Outcome: Resolved/Met  Goal: Medications  Outcome: Resolved/Met  Goal: Respiratory  Outcome: Resolved/Met  Goal: Treatments/Interventions/Procedures  Outcome: Resolved/Met  Goal: Psychosocial  Outcome: Resolved/Met  Goal: *Oxygen saturation within defined limits  Outcome: Resolved/Met  Goal: *Hemodynamically stable  Outcome: Resolved/Met  Goal: *Optimal pain control at patient's stated goal  Outcome: Resolved/Met  Goal: *Anxiety reduced or absent  Outcome: Resolved/Met  Goal: *Demonstrates progressive activity  Outcome: Resolved/Met     Problem: Heart Failure: Day 4  Goal: Off Pathway (Use only if patient is Off Pathway)  Outcome: Resolved/Met  Goal: Activity/Safety  Outcome: Resolved/Met  Goal: Diagnostic Test/Procedures  Outcome: Resolved/Met  Goal: Nutrition/Diet  Outcome: Resolved/Met  Goal: Discharge Planning  Outcome: Resolved/Met  Goal: Medications  Outcome: Resolved/Met  Goal: Respiratory  Outcome: Resolved/Met  Goal: Treatments/Interventions/Procedures  Outcome: Resolved/Met  Goal: Psychosocial  Outcome: Resolved/Met  Goal: *Oxygen saturation within defined limits  Outcome: Resolved/Met  Goal: *Hemodynamically stable  Outcome: Resolved/Met  Goal: *Optimal pain control at patient's stated goal  Outcome: Resolved/Met  Goal: *Anxiety reduced or absent  Outcome: Resolved/Met  Goal: *Demonstrates progressive activity  Outcome: Resolved/Met     Problem: Heart Failure: Day 5  Goal: Off Pathway (Use only if patient is Off Pathway)  Outcome: Progressing Towards Goal  Goal: Activity/Safety  Outcome: Progressing Towards Goal  Goal: Diagnostic Test/Procedures  Outcome: Progressing Towards Goal  Goal: Nutrition/Diet  Outcome: Progressing Towards Goal  Goal: Discharge Planning  Outcome: Progressing Towards Goal  Goal: Medications  Outcome: Progressing Towards Goal  Goal: Respiratory  Outcome: Progressing Towards Goal  Goal: Treatments/Interventions/Procedures  Outcome: Progressing Towards Goal  Goal: Psychosocial  Outcome: Progressing Towards Goal

## 2022-08-18 NOTE — PROGRESS NOTES
SNOW MARTINI  [facility]  [address] [place] [address], [address] [facility]  [phone]  WW[facility]. As It Is  CARDIOLOGY PROGRESS NOTE    8/18/2022 8:19 AM    Admit Date: 8/13/2022    Admit Diagnosis:   Bilateral leg edema [R60.0]  Cardiomegaly [I51.7]  Acute CHF (congestive heart failure) (HCC) [I50.9]  Scrotal edema [N50.89]    Subjective: Orlando Blair reports feeling well this AM, no orthopnea.      Visit Vitals  /71   Pulse 91   Temp 97.2 °F (36.2 °C)   Resp 18   Ht 6' 3\" (1.905 m)   Wt 141.8 kg (312 lb 9.6 oz)   SpO2 90%   BMI 39.07 kg/m²       Current Facility-Administered Medications   Medication Dose Route Frequency    furosemide (LASIX) tablet 40 mg  40 mg Oral DAILY    sodium chloride (NS) flush 10 mL  10 mL IntraVENous PRN    metoprolol succinate (TOPROL-XL) XL tablet 50 mg  50 mg Oral DAILY    sacubitriL-valsartan (ENTRESTO) 24-26 mg tablet 1 Tablet  1 Tablet Oral Q12H    empagliflozin (JARDIANCE) tablet 10 mg  10 mg Oral DAILY    spironolactone (ALDACTONE) tablet 25 mg  25 mg Oral DAILY    potassium chloride (K-DUR, KLOR-CON M20) SR tablet 40 mEq  40 mEq Oral DAILY    nicotine (NICODERM CQ) 14 mg/24 hr patch 1 Patch  1 Patch TransDERmal DAILY    cefTRIAXone (ROCEPHIN) 1 g in 0.9% sodium chloride (MBP/ADV) 50 mL MBP  1 g IntraVENous Q24H    diphenhydrAMINE (BENADRYL) 12.5 mg/5 mL oral liquid 25 mg  25 mg Oral Q6H PRN    aspirin chewable tablet 81 mg  81 mg Oral DAILY    sodium chloride (NS) flush 5-40 mL  5-40 mL IntraVENous Q8H    sodium chloride (NS) flush 5-40 mL  5-40 mL IntraVENous PRN    acetaminophen (TYLENOL) tablet 650 mg  650 mg Oral Q6H PRN    Or    acetaminophen (TYLENOL) suppository 650 mg  650 mg Rectal Q6H PRN    polyethylene glycol (MIRALAX) packet 17 g  17 g Oral DAILY PRN    ondansetron (ZOFRAN ODT) tablet 4 mg  4 mg Oral Q8H PRN    Or    ondansetron (ZOFRAN) injection 4 mg  4 mg IntraVENous Q6H PRN    enoxaparin (LOVENOX) injection 40 mg  40 mg SubCUTAneous BID    diazePAM (VALIUM) tablet 10 mg  10 mg Oral Q1H PRN    diazePAM (VALIUM) tablet 20 mg  20 mg Oral M5J PRN    folic acid (FOLVITE) tablet 1 mg  1 mg Oral DAILY    thiamine mononitrate (B-1) tablet 100 mg  100 mg Oral DAILY    multivitamin, tx-iron-ca-min (THERA-M w/ IRON) tablet 1 Tablet  1 Tablet Oral DAILY    vancomycin (VANCOCIN) 1,000 mg in 0.9% sodium chloride 250 mL ([id number])  1,000 mg IntraVENous Q8H         Objective:      Physical Exam    General: Well developed, in no acute distress, cooperative and alert  HEENT: No carotid bruits, no JVD, trach is midline. Neck Supple, PERRL, EOM intact. Heart:  Normal S1/S2 negative S3 or S4. Regular, no murmur, gallop or rub. Respiratory: Clear bilaterally x 4, no wheezing or rales  Abdomen:   Soft, non-tender, no masses, bowel sounds are active. Extremities:trace ankle edema     Neuro: A&Ox3, speech clear,       Data Review:   Recent Labs     08/18/22 0345 08/17/22 0227   WBC 6.8 7.1   HGB 14.6 14.7   HCT 47.4 47.6    264     Recent Labs     08/18/22  0345 08/17/22  1138 08/17/22 0227 08/16/22  0232   *  --  136 136   K 4.2  --  3.8 3.7   CL 97  --  95* 93*   CO2 31  --  39* 41*   *  --  104* 112*   BUN 19  --  15 17   CREA 0.92  --  0.87 0.93   CA 9.3  --  9.8 9.5   MG 2.3  --   --   --    ALB  --  3.4*  --   --    TBILI  --  0.8  --   --    ALT  --  53  --   --    INR  --   --  1.1  --        No results for input(s): TROIQ, CPK, CKMB in the last 72 hours. Intake/Output Summary (Last 24 hours) at 8/18/2022 0615  Last data filed at 8/17/2022 1924  Gross per 24 hour   Intake 240 ml   Output 5525 ml   Net -5285 ml        Telemetry: NSR  EKG:  Cxray:  ECHO   Left Ventricle: Severely reduced left ventricular systolic function with a visually estimated EF of 25 - 30%. Left ventricle is dilated. Increased wall thickness. Findings consistent with mild concentric hypertrophy. There are regional wall motion abnormalities. Right Ventricle: Right ventricle is mildly dilated. Reduced systolic function. Mitral Valve: Mildly thickened leaflet. Mild regurgitation. Mild stenosis noted. Left Atrium: Left atrium is dilated. Pericardium: Trivial pericardial effusion present. No indication of cardiac tamponade. Technical qualifiers: Echo study was technically difficult due to patient's body habitus. Assessment:     Active Problems:    Cardiomegaly (8/13/2022)      Scrotal edema (8/13/2022)      Bilateral leg edema (8/13/2022)      Acute CHF (congestive heart failure) (HCC) (8/13/2022)      Plan:     Systolic Heart Failure: EF 25-30%. Started on GDMT. Toprol/Spironolactone/Jardiance continue lasix 40mg daily, entresto to start today. Lasix reduced to 40mg PO. Second part of [facility] scan pending for today, Life vest ordered and pending placement on either this evening or Friday. 2.   PVC/nsvt: keep K+4.0, magnesium 2.0 5 beat run over night noted     3. pHTN: Seen on CT, recommend out patient ROSEMARIE evaluation     4. Cirrhosis: Appreciate GI consult, started on spironolactone. Complete NST today, change to PO diuretic, plan for DC tomorrow AM from cardiology stand point. Susan Nail to fit patient today.       Jazlyn Morales NP

## 2022-08-18 NOTE — PROGRESS NOTES
Hospitalist Progress Note    NAME: Maryse Ham   :  1985   MRN:  [id number]       Assessment / Plan:  New onset acute systolic CHF POA LVEF 25 to 30%  Essential HTN POA /112  Acute hypoxic respiratory failure POA remains on 1 liter  Reduced RV function POA  Elevated HS troponin 101  Cirrhosis POA  Anasarca  SOB with bilateral tense leg edema, abdominal wall and scrotal edema Weight gain of 30 Lbs. over 3 weeks  pBNP 4509  Echo LVEF 25-30%, reduced RV function       ? Ischemic, stress test        Related to ETOH use        ? Related to ROSEMARIE  TSH 2.49  D-dimer elevated but CTA negative         Bibasilar atelectasis  UDS negative  Diuresed with lasix, improved SOB and edema       Now weaned off O2  Aldactone added  Stopped lisinopril, Starting entresto   Cont Metoprolol   SGLT2 added by Cardiology  Cardiology setting up life vest for discharge  Counseled re abstinence from alcohol       Discussed and reinforced importance of not drinking  Sleep study as outpt. GI consulted for cirrhosis       Hepatology appointment on DC  Stress test results pending  OOB  Plan for discharge in AM    Bilateral leg cellulitis and abdominal wall cellulitis POA  Sinus tachycardia  Blood Culture negative to date  MRSA nasal swab  C/w rocephin and vancomycin  Lower extremity Doppler negative for DVT  This is most likely bilateral venous stasis      Procalcitonin < 9.37    Metabolic alkalosis likely contraction from diuretics  Admit HCO3 27 --> 41 on  --> 31 on   Improving on reduced diuretics    Pre DM HgBa1c 6.1  Diet modification   Jardiance started by Cardio     Excessive alcohol use  Tobacco abuse  Drinks 20-25 shots of bourbon or pints of beer a week  Monitor for withdrawal, no evidence after 4 days  MVI, thiamine, folic acid. CIWA with prn oral valium  Patient counseled about cessation, will order nicotine patch     Obesity POA Body mass index is 39.07 kg/m².     Code: full  DVT prophylaxis: lovenox  Surrogate decision maker:  wife [facility] AURA BARRAGAN [phone]    Code status: Full  Prophylaxis: Lovenox  Recommended Disposition: Home w/Family     Subjective:     Chief Complaint / Reason for Physician Visit  Fu new onset CHF . Discussed with RN events overnight. \"I feel good\"  Weaned off O2, no SOB on Room air  Completing stress test, life vest being set up  Planning for discharge in AM  No CP  Spoke with father at bedside    Review of Systems:  Symptom Y/N Comments  Symptom Y/N Comments   Fever/Chills n   Chest Pain n    Poor Appetite    Edema     Cough n   Abdominal Pain n    Sputum    Joint Pain     SOB/MORENO n   Pruritis/Rash     Nausea/vomit n   Tolerating PT/OT     Diarrhea n   Tolerating Diet y    Constipation    Other       Could NOT obtain due to:      Objective:     VITALS:   Last 24hrs VS reviewed since prior progress note. Most recent are:  Patient Vitals for the past 24 hrs:   Temp Pulse Resp BP SpO2   08/18/22 1040 97.3 °F (36.3 °C) 93 18 106/78 90 %   08/18/22 0929 -- 94 -- 122/78 --   08/18/22 0730 97.5 °F (36.4 °C) 88 18 111/75 90 %   08/18/22 0415 97.2 °F (36.2 °C) 91 18 116/71 90 %   08/17/22 2307 96.9 °F (36.1 °C) 97 18 102/86 97 %   08/17/22 1923 98.3 °F (36.8 °C) 95 18 101/77 91 %   08/17/22 1457 97.5 °F (36.4 °C) 90 18 (!) 100/56 93 %         Intake/Output Summary (Last 24 hours) at 8/18/2022 1426  Last data filed at 8/18/2022 1245  Gross per 24 hour   Intake --   Output 7070 ml   Net -7070 ml          I had a face to face encounter and independently examined this patient on 8/18/2022, as outlined below:  PHYSICAL EXAM:  General: WD, WN. Alert, cooperative, no acute distress    EENT:  Anicteric sclerae. MMM  Resp:  Decreased BS at bases, no wheezing. No accessory muscle use  CV:  Regular  rhythm,  1+ edema  GI:  Soft, Non distended, Non tender. +Bowel sounds  Neurologic:  Alert and oriented X 3, normal speech,   Psych:   Good insight.  Not anxious nor agitated  Skin:  minimal erythema of KORI and abd wall. No jaundice    Reviewed most current lab test results and cultures  YES  Reviewed most current radiology test results   YES  Review and summation of old records today    NO  Reviewed patient's current orders and MAR    YES  PMH/SH reviewed - no change compared to H&P  ________________________________________________________________________  Care Plan discussed with:    Comments   Patient x    Family  x Father   RN x    Care Manager x    Consultant                       x Multidiciplinary team rounds were held today with , nursing, pharmacist and clinical coordinator. Patient's plan of care was discussed; medications were reviewed and discharge planning was addressed. ________________________________________________________________________        Comments   >50% of visit spent in counseling and coordination of care     ________________________________________________________________________  Edson Boyd MD     Procedures: see electronic medical records for all procedures/Xrays and details which were not copied into this note but were reviewed prior to creation of Plan. LABS:  I reviewed today's most current labs and imaging studies.   Pertinent labs include:  Recent Labs     08/18/22 0345 08/17/22 0227   WBC 6.8 7.1   HGB 14.6 14.7   HCT 47.4 47.6    264       Recent Labs     08/18/22 0345 08/17/22  1138 08/17/22 0227 08/16/22  0232   *  --  136 136   K 4.2  --  3.8 3.7   CL 97  --  95* 93*   CO2 31  --  39* 41*   *  --  104* 112*   BUN 19  --  15 17   CREA 0.92  --  0.87 0.93   CA 9.3  --  9.8 9.5   MG 2.3  --   --   --    ALB  --  3.4*  --   --    TBILI  --  0.8  --   --    ALT  --  53  --   --    INR  --   --  1.1  --          Signed: Edson Boyd MD

## 2022-08-18 NOTE — PROGRESS NOTES
0700: Bedside shift change report given to Chaim Bright (oncoming nurse) by Surinder Alvarado (offgoing nurse). Report included the following information SBAR and Kardex. 1230: Spoke with Katt Miller at [place]; he will be here to fit patient around 1500.

## 2022-08-18 NOTE — PROGRESS NOTES
1500: Bedside shift change report given to MECHELLE Green (oncoming nurse) by Luisa Horton RN and Darrel Martinez RN (offgoing nurse). Report included the following information SBAR, Kardex, Intake/Output, MAR, Recent Results, and Cardiac Rhythm NSR with PVC's .     1900:  End of Shift Note    Bedside shift change report given to Gina Jorgensen RN (oncoming nurse) by Meghan Miles RN (offgoing nurse). Report included the following information SBAR, Intake/Output, MAR, Recent Results, and Cardiac Rhythm NSR    Shift worked:  Day     Shift summary and any significant changes:     None     Concerns for physician to address:  None     Zone phone for oncoming shift:   1804       Activity:  Activity Level: Up ad florencio  Number times ambulated in hallways past shift: 1  Number of times OOB to chair past shift: 5    Cardiac:   Cardiac Monitoring: Yes      Cardiac Rhythm: Sinus Rhythm, PVC    Access:  Current line(s): PIV     Genitourinary:   Urinary status: voiding    Respiratory:   O2 Device: None (Room air)  Chronic home O2 use?: N/A  Incentive spirometer at bedside: YES       GI:  Last Bowel Movement Date: 08/18/22  Current diet:  ADULT DIET Regular; Low Fat/Low Chol/High Fiber/2 gm Na  Passing flatus: YES  Tolerating current diet: YES       Pain Management:   Patient states pain is manageable on current regimen: N/A    Skin:  Jean Paul Score: 21  Interventions: increase time out of bed    Patient Safety:  Fall Score:  Total Score: 1  Interventions: gripper socks and pt to call before getting OOB       Length of Stay:  Expected LOS: 3d 19h  Actual LOS: [address], RN

## 2022-08-19 VITALS
WEIGHT: 309 LBS | SYSTOLIC BLOOD PRESSURE: 136 MMHG | OXYGEN SATURATION: 95 % | RESPIRATION RATE: 20 BRPM | HEIGHT: 75 IN | HEART RATE: 98 BPM | DIASTOLIC BLOOD PRESSURE: 69 MMHG | BODY MASS INDEX: 38.42 KG/M2 | TEMPERATURE: 98.2 F

## 2022-08-19 LAB
ANION GAP SERPL CALC-SCNC: 4 MMOL/L (ref 5–15)
BASOPHILS # BLD: 0.1 K/UL (ref 0–0.1)
BASOPHILS NFR BLD: 1 % (ref 0–1)
BUN SERPL-MCNC: 20 MG/DL (ref 6–20)
BUN/CREAT SERPL: 20 (ref 12–20)
CALCIUM SERPL-MCNC: 9.6 MG/DL (ref 8.5–10.1)
CHLORIDE SERPL-SCNC: 98 MMOL/L (ref 97–108)
CO2 SERPL-SCNC: 32 MMOL/L (ref 21–32)
CREAT SERPL-MCNC: 1 MG/DL (ref 0.7–1.3)
DIFFERENTIAL METHOD BLD: ABNORMAL
EOSINOPHIL # BLD: 0.4 K/UL (ref 0–0.4)
EOSINOPHIL NFR BLD: 6 % (ref 0–7)
ERYTHROCYTE [DISTWIDTH] IN BLOOD BY AUTOMATED COUNT: 14.5 % (ref 11.5–14.5)
GLUCOSE SERPL-MCNC: 122 MG/DL (ref 65–100)
HCT VFR BLD AUTO: 53.6 % (ref 36.6–50.3)
HGB BLD-MCNC: 16.4 G/DL (ref 12.1–17)
IMM GRANULOCYTES # BLD AUTO: 0 K/UL (ref 0–0.04)
IMM GRANULOCYTES NFR BLD AUTO: 0 % (ref 0–0.5)
LYMPHOCYTES # BLD: 1.3 K/UL (ref 0.8–3.5)
LYMPHOCYTES NFR BLD: 19 % (ref 12–49)
MCH RBC QN AUTO: 27.7 PG (ref 26–34)
MCHC RBC AUTO-ENTMCNC: 30.6 G/DL (ref 30–36.5)
MCV RBC AUTO: 90.5 FL (ref 80–99)
MONOCYTES # BLD: 0.7 K/UL (ref 0–1)
MONOCYTES NFR BLD: 10 % (ref 5–13)
NEUTS SEG # BLD: 4.2 K/UL (ref 1.8–8)
NEUTS SEG NFR BLD: 64 % (ref 32–75)
NRBC # BLD: 0 K/UL (ref 0–0.01)
NRBC BLD-RTO: 0 PER 100 WBC
PLATELET # BLD AUTO: 213 K/UL (ref 150–400)
PMV BLD AUTO: 11.2 FL (ref 8.9–12.9)
POTASSIUM SERPL-SCNC: 4.1 MMOL/L (ref 3.5–5.1)
RBC # BLD AUTO: 5.92 M/UL (ref 4.1–5.7)
SODIUM SERPL-SCNC: 134 MMOL/L (ref 136–145)
WBC # BLD AUTO: 6.6 K/UL (ref 4.1–11.1)

## 2022-08-19 PROCEDURE — 74011250637 HC RX REV CODE- 250/637: Performed by: INTERNAL MEDICINE

## 2022-08-19 PROCEDURE — 85025 COMPLETE CBC W/AUTO DIFF WBC: CPT

## 2022-08-19 PROCEDURE — 74011250636 HC RX REV CODE- 250/636: Performed by: HOSPITALIST

## 2022-08-19 PROCEDURE — 80048 BASIC METABOLIC PNL TOTAL CA: CPT

## 2022-08-19 PROCEDURE — 74011250637 HC RX REV CODE- 250/637: Performed by: NURSE PRACTITIONER

## 2022-08-19 PROCEDURE — 74011250637 HC RX REV CODE- 250/637: Performed by: HOSPITALIST

## 2022-08-19 PROCEDURE — 36415 COLL VENOUS BLD VENIPUNCTURE: CPT

## 2022-08-19 PROCEDURE — 94760 N-INVAS EAR/PLS OXIMETRY 1: CPT

## 2022-08-19 RX ORDER — POTASSIUM CHLORIDE 20 MEQ/1
20 TABLET, EXTENDED RELEASE ORAL DAILY
Qty: 30 TABLET | Refills: 0 | Status: SHIPPED | OUTPATIENT
Start: 2022-08-20 | End: 2022-09-19

## 2022-08-19 RX ORDER — ASPIRIN 325 MG/1
100 TABLET, FILM COATED ORAL DAILY
Qty: 30 TABLET | Refills: 0 | Status: SHIPPED | OUTPATIENT
Start: 2022-08-20 | End: 2022-09-19

## 2022-08-19 RX ORDER — FUROSEMIDE 40 MG/1
40 TABLET ORAL DAILY
Qty: 30 TABLET | Refills: 0 | Status: SHIPPED | OUTPATIENT
Start: 2022-08-19 | End: 2022-09-18

## 2022-08-19 RX ORDER — METOPROLOL SUCCINATE 50 MG/1
50 TABLET, EXTENDED RELEASE ORAL DAILY
Qty: 30 TABLET | Refills: 0 | Status: SHIPPED | OUTPATIENT
Start: 2022-08-20 | End: 2022-09-19

## 2022-08-19 RX ORDER — SPIRONOLACTONE 25 MG/1
25 TABLET ORAL DAILY
Qty: 30 TABLET | Refills: 0 | Status: SHIPPED | OUTPATIENT
Start: 2022-08-20 | End: 2022-09-19

## 2022-08-19 RX ADMIN — SACUBITRIL AND VALSARTAN 1 TABLET: 24; 26 TABLET, FILM COATED ORAL at 08:16

## 2022-08-19 RX ADMIN — FOLIC ACID 1 MG: 1 TABLET ORAL at 08:17

## 2022-08-19 RX ADMIN — Medication 100 MG: at 08:17

## 2022-08-19 RX ADMIN — Medication 1 TABLET: at 08:16

## 2022-08-19 RX ADMIN — POTASSIUM CHLORIDE 40 MEQ: 20 TABLET, EXTENDED RELEASE ORAL at 08:17

## 2022-08-19 RX ADMIN — SPIRONOLACTONE 25 MG: 25 TABLET ORAL at 08:18

## 2022-08-19 RX ADMIN — ASPIRIN 81 MG: 81 TABLET, CHEWABLE ORAL at 08:17

## 2022-08-19 RX ADMIN — EMPAGLIFLOZIN 10 MG: 10 TABLET, FILM COATED ORAL at 08:18

## 2022-08-19 RX ADMIN — FUROSEMIDE 40 MG: 40 TABLET ORAL at 08:17

## 2022-08-19 RX ADMIN — ENOXAPARIN SODIUM 40 MG: 100 INJECTION SUBCUTANEOUS at 08:18

## 2022-08-19 RX ADMIN — METOPROLOL SUCCINATE 50 MG: 50 TABLET, EXTENDED RELEASE ORAL at 08:16

## 2022-08-19 NOTE — PROGRESS NOTES
Transition of Care Plan:     RUR: 5%  Disposition: Home with Follow-up appointment  Follow up appointments: New PCP. DME needed: [address] at Discharge: Pt's wife will transport at d/c  [place] or means to access home:  Pt has access      IM Medicare Letter: Not needed  Is patient a  and connected with the [place]? No              If yes, was [place] transfer form completed and [facility] notified? N/A  Caregiver Contact: Kasey Phillip- Wife [phone]  Discharge Caregiver contacted prior to discharge? Yes  Care Conference needed?:    No    12:16pm-No further CM needs identified. CM notified pt's nurse of d/c.    12:10pm- CM called Charlie Garcia NP office to schedule follow-up appointment. Office was closed. 12:08pm- CM met wit pt and pt's wife at bedside to discuss d/c plan. Pt's wife will transport pt at d/c. Care Management Interventions  PCP Verified by CM: No  Mode of Transport at Discharge: Other (see comment) (Pt's wife will transport at d/c. )  Transition of Care Consult (CM Consult):  Other (Home with Follow-up appointment)  Discharge Durable Medical Equipment: Yes Katie Oshea)  Physical Therapy Consult: No  Occupational Therapy Consult: No  Speech Therapy Consult: No  Support Systems: Spouse/Significant Other  Confirm Follow Up Transport: Self  Discharge Location  Patient Expects to be Discharged to[de-identified] Home (Home with Follow-up appointments)      Raymundo Reeves [address] [place] [address]  [phone]

## 2022-08-19 NOTE — PROGRESS NOTES
[facility]  [address]  [address]  [phone]  [email or web address]  CARDIOLOGY PROGRESS NOTE    8/19/2022 8:19 AM    Admit Date: 8/13/2022    Admit Diagnosis:   Bilateral leg edema [R60.0]  Cardiomegaly [I51.7]  Acute CHF (congestive heart failure) (HCC) [I50.9]  Scrotal edema [N50.89]    Subjective:      Luis Antonio NICKIE Jones feeling well this AM    Visit Vitals  /75 (BP 1 Location: Left upper arm, BP Patient Position: At rest)   Pulse 88   Temp 98.2 °F (36.8 °C)   Resp 20   Ht 6' 3\" (1.905 m)   Wt 140.2 kg (309 lb)   SpO2 95%   BMI 38.62 kg/m²       Current Facility-Administered Medications   Medication Dose Route Frequency    furosemide (LASIX) tablet 40 mg  40 mg Oral DAILY    sodium chloride (NS) flush 10 mL  10 mL IntraVENous PRN    metoprolol succinate (TOPROL-XL) XL tablet 50 mg  50 mg Oral DAILY    sacubitriL-valsartan (ENTRESTO) 24-26 mg tablet 1 Tablet  1 Tablet Oral Q12H    empagliflozin (JARDIANCE) tablet 10 mg  10 mg Oral DAILY    spironolactone (ALDACTONE) tablet 25 mg  25 mg Oral DAILY    potassium chloride (K-DUR, KLOR-CON M20) SR tablet 40 mEq  40 mEq Oral DAILY    nicotine (NICODERM CQ) 14 mg/24 hr patch 1 Patch  1 Patch TransDERmal DAILY    diphenhydrAMINE (BENADRYL) 12.5 mg/5 mL oral liquid 25 mg  25 mg Oral Q6H PRN    aspirin chewable tablet 81 mg  81 mg Oral DAILY    sodium chloride (NS) flush 5-40 mL  5-40 mL IntraVENous Q8H    sodium chloride (NS) flush 5-40 mL  5-40 mL IntraVENous PRN    acetaminophen (TYLENOL) tablet 650 mg  650 mg Oral Q6H PRN    Or    acetaminophen (TYLENOL) suppository 650 mg  650 mg Rectal Q6H PRN    polyethylene glycol (MIRALAX) packet 17 g  17 g Oral DAILY PRN    ondansetron (ZOFRAN ODT) tablet 4 mg  4 mg Oral Q8H PRN    Or    ondansetron (ZOFRAN) injection 4 mg  4 mg IntraVENous Q6H PRN    enoxaparin (LOVENOX) injection 40 mg  40 mg SubCUTAneous BID    diazePAM (VALIUM) tablet 10 mg  10 mg Oral Q1H PRN diazePAM (VALIUM) tablet 20 mg  20 mg Oral Q3X PRN    folic acid (FOLVITE) tablet 1 mg  1 mg Oral DAILY    thiamine mononitrate (B-1) tablet 100 mg  100 mg Oral DAILY    multivitamin, tx-iron-ca-min (THERA-M w/ IRON) tablet 1 Tablet  1 Tablet Oral DAILY         Objective:      Physical Exam    General: Well developed, in no acute distress, cooperative and alert  HEENT: No carotid bruits, no JVD, trach is midline. Neck Supple, PERRL, EOM intact. Heart:  Normal S1/S2 negative S3 or S4. Regular, no murmur, gallop or rub. Respiratory: Clear bilaterally x 4, no wheezing or rales  Abdomen:   Soft, non-tender, no masses, bowel sounds are active. Extremities:trace ankle edema     Neuro: A&Ox3, speech clear,       Data Review:   Recent Labs     08/19/22 0411 08/18/22  0345 08/17/22  0227   WBC 6.6 6.8 7.1   HGB 16.4 14.6 14.7   HCT 53.6* 47.4 47.6    192 264     Recent Labs     08/19/22  0411 08/18/22  0345 08/17/22  1138 08/17/22  0227   * 134*  --  136   K 4.1 4.2  --  3.8   CL 98 97  --  95*   CO2 32 31  --  39*   * 113*  --  104*   BUN 20 19  --  15   CREA 1.00 0.92  --  0.87   CA 9.6 9.3  --  9.8   MG  --  2.3  --   --    ALB  --   --  3.4*  --    TBILI  --   --  0.8  --    ALT  --   --  53  --    INR  --   --   --  1.1       No results for input(s): TROIQ, CPK, CKMB in the last 72 hours. Intake/Output Summary (Last 24 hours) at 8/19/2022 0527  Last data filed at 8/19/2022 0410  Gross per 24 hour   Intake 240 ml   Output 5000 ml   Net -4760 ml        Telemetry: NSR  EKG:  Cxray:  ECHO   Left Ventricle: Severely reduced left ventricular systolic function with a visually estimated EF of 25 - 30%. Left ventricle is dilated. Increased wall thickness. Findings consistent with mild concentric hypertrophy. There are regional wall motion abnormalities. Right Ventricle: Right ventricle is mildly dilated. Reduced systolic function. Mitral Valve: Mildly thickened leaflet. Mild regurgitation. Mild stenosis noted. Left Atrium: Left atrium is dilated. Pericardium: Trivial pericardial effusion present. No indication of cardiac tamponade. Technical qualifiers: Echo study was technically difficult due to patient's body habitus. Assessment:     Active Problems:    Cardiomegaly (8/13/2022)      Scrotal edema (8/13/2022)      Bilateral leg edema (8/13/2022)      Acute CHF (congestive heart failure) (HCC) (8/13/2022)      Plan:     Systolic Heart Failure: EF 25-30%. Continue GDMT. Toprol/Spironolactone/Jardiance/Entresto/Lasix. Life vest upon discharge. Nuclear stress test is negative for ischemia. Discussed with Dr Ricci Blankenship no indication for cardiac cath. 2.   PVC/nsvt: keep K+4.0, magnesium 2.0 5 beat run over night noted     3. pHTN: Seen on CT, recommend out patient ROSEMARIE evaluation     4. Cirrhosis: Appreciate GI consult, started on spironolactone. Compensated systolic CHF, no ischemia on NST. Can D/C home today, follow up with me in 1 week. Will order out patient cMRI and sleep medicine consult this Am.       Alverto Castillo NP

## 2022-08-19 NOTE — DISCHARGE SUMMARY
Hospitalist Discharge Summary     Patient ID:  Duong Molina  [id number]  37 y.o.  1985 8/13/2022    PCP on record: None    Admit date: 8/13/2022  Discharge date and time: 8/19/2022    DISCHARGE DIAGNOSIS:    New onset acute systolic CHF POA LVEF 25 to 30%  Essential HTN POA /112  Cirrhosis POA  Anasarca      CONSULTATIONS:  IP CONSULT TO CARDIOLOGY  IP CONSULT TO NEPHROLOGY  IP CONSULT TO GASTROENTEROLOGY    Excerpted HPI from H&P of Kalyn Bailey MD:  Duong Molina is a 44 y.o. male with PMH papilledema treated with serial LP for pressure reduction, Bell's palsy affecting left face presents for above symptoms. Patient with 3 weeks of progressive SOB, primarily MORENO, orthopnea. For past 3 weeks, he has been unable to lay down at night to sleep and been sleeping in on his knees in prayer position leaning against bed. About 2 weeks ago, developed a rash in b/l medial thighs with redness, blisters and swelling that spread distally into lower legs and feet. Rash was itching. He applied lotrimin cream and itching resolved. He also has calluses in heels and right heel cracked and bled for a short time. He has also developed swelling, redness of abdominal wall. This morning, testicles are swollen which prompted him to come to ER for evaluation. ______________________________________________________________________  DISCHARGE SUMMARY/HOSPITAL COURSE:  for full details see H&P, daily progress notes, labs, consult notes. New onset acute systolic CHF POA LVEF 25 to 30%  Essential HTN POA /112  Acute hypoxic respiratory failure POA remains on 1 liter  Reduced RV function POA  Elevated HS troponin 101  Cirrhosis POA  Anasarca  SOB with bilateral tense leg edema, abdominal wall and scrotal edema Weight gain of 30 Lbs. over 3 weeks  pBNP 4509  Echo LVEF 25-30%, reduced RV function       ? Ischemic, stress test        Related to ETOH use        ?  Related to ROSEMARIE  TSH 2.49  D-dimer elevated but CTA negative         Bibasilar atelectasis  UDS negative  Diuresed with lasix, improved SOB and edema       Now weaned off O2  Aldactone added  Stopped lisinopril, Starting entresto   Cont Metoprolol   SGLT2 added by Cardiology  Cardiology setting up life vest for discharge  Counseled re abstinence from alcohol       Discussed and reinforced importance of not drinking  Sleep study as outpt. GI consulted for cirrhosis       Hepatology appointment on DC  Stress test results negative patient was discharged home     Bilateral leg cellulitis and abdominal wall cellulitis POA  Sinus tachycardia  Blood Culture negative to date  MRSA nasal swab  C/w rocephin and vancomycin  Lower extremity Doppler negative for DVT  This is most likely bilateral venous stasis      Procalcitonin < 7.95     Metabolic alkalosis likely contraction from diuretics  Admit HCO3 27 --> 41 on 8/16 --> 31 on 8/18  Improving on reduced diuretics     Pre DM HgBa1c 6.1  Diet modification   Jardiance started by Cardio      Excessive alcohol use  Tobacco abuse  Drinks 20-25 shots of bourbon or pints of beer a week  Monitor for withdrawal, no evidence after 4 days  MVI, thiamine, folic acid. CIWA with prn oral valium  Patient counseled about cessation, will order nicotine patch        _______________________________________________________________________  Patient seen and examined by me on discharge day. Pertinent Findings:  Gen:    Not in distress  Chest: Clear lungs  CVS:   Regular rhythm. No edema  Abd:  Soft, not distended, not tender  Neuro:  Alert,   _______________________________________________________________________  DISCHARGE MEDICATIONS:   Discharge Medication List as of 8/19/2022 11:57 AM        START taking these medications    Details   empagliflozin (JARDIANCE) 10 mg tablet Take 1 Tablet by mouth daily. , Normal, Disp-30 Tablet, R-0      furosemide (LASIX) 40 mg tablet Take 1 Tablet by mouth daily for 30 days. , Normal, Disp-30 Tablet, R-0      metoprolol succinate (TOPROL-XL) 50 mg XL tablet Take 1 Tablet by mouth daily for 30 days. , Normal, Disp-30 Tablet, R-0      thiamine mononitrate (B-1) 100 mg tablet Take 1 Tablet by mouth daily for 30 days. , Normal, Disp-30 Tablet, R-0      sacubitril-valsartan (ENTRESTO) 24 mg/26 mg tablet Take 1 Tablet by mouth every twelve (12) hours for 30 days. , Normal, Disp-60 Tablet, R-0      potassium chloride (K-DUR, KLOR-CON M20) 20 mEq tablet Take 1 Tablet by mouth daily for 30 days. , Normal, Disp-30 Tablet, R-0      spironolactone (ALDACTONE) 25 mg tablet Take 1 Tablet by mouth daily for 30 days. , Normal, Disp-30 Tablet, R-0           CONTINUE these medications which have NOT CHANGED    Details   aspirin 81 mg chewable tablet Take 81 mg by mouth in the morning., Historical Med           STOP taking these medications       ibuprofen (MOTRIN) 800 mg tablet Comments:   Reason for Stopping:                 Patient Follow Up Instructions: Activity: Activity as tolerated  Diet: Cardiac Diet  Wound Care: None needed      Follow-up Information       Follow up With Specialties Details Why Contact Info    Renetta Benitez MD  Go on 9/23/2022 at 10:15am for your PCP hospital follow up. IMPORTANT: Please arrive 15 minutes early, bring photo ID, insurance cards, copay, medication bottles and any completed forms.  [facility] [address]  [place], [address] [facility]  [phone]    Elizabeth Sinclair  [facility] Vascular Surgery, Nurse Practitioner, Cardiovascular Disease Physician Schedule an appointment as soon as possible for a visit in 1 week(s)  [address] [id number]            ________________________________________________________________    Risk of deterioration: Low    Condition at Discharge:  Stable  __________________________________________________________________    Disposition  Home with family, no needs    ____________________________________________________________________    Code Status: Full Code  ___________________________________________________________________      Total time in minutes spent coordinating this discharge (includes going over instructions, follow-up, prescriptions, and preparing report for sign off to her PCP) :  >30 minutes    Signed:  Jimena Meyers MD

## 2022-08-19 NOTE — DISCHARGE INSTRUCTIONS
HOSPITALIST DISCHARGE INSTRUCTIONS    NAME: Nelly Lazcano   :  1985   MRN:  [id number]     Date/Time:  2022 10:51 AM    ADMIT DATE: 2022   DISCHARGE DATE: 2022     New onset acute systolic CHF POA LVEF 25 to 30%  Essential HTN POA /112  Cirrhosis POA  Anasarca    It is important that you take the medication exactly as they are prescribed. Keep your medication in the bottles provided by the pharmacist and keep a list of the medication names, dosages, and times to be taken in your wallet. Do not take other medications without consulting your doctor. What to do at Home    Recommended diet:  {diet:09442}    Recommended activity: {discharge activity:59625}      If you have questions regarding the hospital related prescriptions or hospital related issues please call [address] [place] [address]' office at [phone]. You can always direct your questions to your primary care doctor if you are unable to reach your hospital physician; your PCP works as an extension of your hospital doctor just like your hospital doctor is an extension of your PCP for your time at the hospital [facility])    If you experience any of the following symptoms then please call your primary care physician or return to the emergency room if you cannot get hold of your doctor:    Fever, chills, nausea, vomiting, or persistent diarrhea  Worsening weakness or new problems with your speech or balance  Dark stools or visible blood in your stools  New Leg swelling or shortness of breath as these could be signs of a clot    Additional Instructions:      Bring these papers with you to your follow up appointments. The papers will help your doctors be sure to continue the care plan from the hospital.              Information obtained by :  I understand that if any problems occur once I am at home I am to contact my physician. I understand and acknowledge receipt of the instructions indicated above. Physician's or R.N.'s Signature                                                                  Date/Time                                                                                                                                              Patient or Representative Signature

## 2022-08-19 NOTE — PROGRESS NOTES
Problem: Falls - Risk of  Goal: *Absence of Falls  Description: Document Bard  Fall Risk and appropriate interventions in the flowsheet.   Outcome: Progressing Towards Goal  Note: Fall Risk Interventions:            Medication Interventions: Patient to call before getting OOB, Teach patient to arise slowly         History of Falls Interventions: Door open when patient unattended, Room close to nurse's station

## 2022-08-19 NOTE — PROGRESS NOTES
Discharged home with all belongings and accompanied by spouse. PIV removed, wearing life vest on discharge. Discharge AVS gone through with patient and spouse, all questions answered. Transported to [facility] via [facility] accompanied by staff and spouse. Spouse to drive home.

## 2022-08-19 NOTE — PROGRESS NOTES
End of Shift Note    Bedside shift change report given to Lm Calloway RN (oncoming nurse) by Casper Mccloud RN (offgoing nurse). Report included the following information SBAR    Shift worked:  7pm-7am     Shift summary and any significant changes:         Concerns for physician to address: Uneventful night; possible dc today. Zone phone for oncoming shift:          Activity:  Activity Level: Up ad florencio  Number times ambulated in hallways past shift: 0  Number of times OOB to chair past shift: 0    Cardiac:   Cardiac Monitoring: Yes      Cardiac Rhythm: Sinus Rhythm    Access:  Current line(s): PIV     Genitourinary:   Urinary status: voiding    Respiratory:   O2 Device: None (Room air)  Chronic home O2 use?: NO  Incentive spirometer at bedside: NO       GI:  Last Bowel Movement Date: 08/18/22  Current diet:  ADULT DIET Regular; Low Fat/Low Chol/High Fiber/2 gm Na  Passing flatus: YES  Tolerating current diet: YES       Pain Management:   Patient states pain is manageable on current regimen: YES    Skin:  Jean Paul Score: 23  Interventions: increase time out of bed    Patient Safety:  Fall Score:  Total Score: 1  Interventions: gripper socks and pt to call before getting OOB       Length of Stay:  Expected LOS: 3d 19h  Actual LOS: [address] [facility] , RN

## 2022-10-04 ENCOUNTER — OFFICE VISIT (OUTPATIENT)
Dept: SLEEP MEDICINE | Age: 37
End: 2022-10-04
Payer: COMMERCIAL

## 2022-10-04 ENCOUNTER — DOCUMENTATION ONLY (OUTPATIENT)
Dept: SLEEP MEDICINE | Age: 37
End: 2022-10-04

## 2022-10-04 VITALS
OXYGEN SATURATION: 93 % | HEART RATE: 92 BPM | BODY MASS INDEX: 38.17 KG/M2 | RESPIRATION RATE: 18 BRPM | TEMPERATURE: 100.4 F | HEIGHT: 75 IN | WEIGHT: 307 LBS | SYSTOLIC BLOOD PRESSURE: 132 MMHG | DIASTOLIC BLOOD PRESSURE: 90 MMHG

## 2022-10-04 DIAGNOSIS — G47.33 OBSTRUCTIVE SLEEP APNEA (ADULT) (PEDIATRIC): Primary | ICD-10-CM

## 2022-10-04 DIAGNOSIS — E66.9 OBESITY, CLASS II, BMI 35-39.9: ICD-10-CM

## 2022-10-04 DIAGNOSIS — I50.9 ACUTE CONGESTIVE HEART FAILURE, UNSPECIFIED HEART FAILURE TYPE (HCC): ICD-10-CM

## 2022-10-04 PROCEDURE — 99204 OFFICE O/P NEW MOD 45 MIN: CPT | Performed by: INTERNAL MEDICINE

## 2022-10-04 RX ORDER — POTASSIUM CHLORIDE 20 MEQ/1
TABLET, EXTENDED RELEASE ORAL
COMMUNITY

## 2022-10-04 RX ORDER — SPIRONOLACTONE 25 MG/1
TABLET ORAL
COMMUNITY

## 2022-10-04 RX ORDER — DAPAGLIFLOZIN 10 MG/1
TABLET, FILM COATED ORAL
COMMUNITY
Start: 2022-09-16

## 2022-10-04 RX ORDER — METOPROLOL SUCCINATE 50 MG/1
TABLET, EXTENDED RELEASE ORAL
COMMUNITY

## 2022-10-04 RX ORDER — SACUBITRIL AND VALSARTAN 24; 26 MG/1; MG/1
TABLET, FILM COATED ORAL
COMMUNITY

## 2022-10-04 RX ORDER — FUROSEMIDE 40 MG/1
TABLET ORAL
COMMUNITY

## 2022-10-04 NOTE — PROGRESS NOTES
217 [facility] [place]. [place], [address]  Tel.  [phone]  Fax. 100 [facility] 60  [place], [address] [facility]  Tel.  [phone]  Fax. [phone] [address]  Tel.  [phone]  Fax. [phone]         Subjective: Jabari Tan is an 44 y.o. male referred for evaluation for a sleep disorder. He complains of snoring, periods of not breathing associated with excessive daytime sleepiness. Symptoms began several years ago, gradually improving since that time. He usually can fall asleep in a few minutes. He was recently admitted to the hospital with acute heart failure. He currently wears a defibrillator. He lost 80 pounds since hospitalization. Since discharge, he has stopped all alcohol and smoking. His wife sleeps separately. She is expecting their first child together in November. Family or house members note snoring, periods of not breathing. He denies falling asleep while at work, driving. Jabari Tan does wake up frequently at night. He is not bothered by waking up too early and left unable to get back to sleep. He actually sleeps about 5 hours at night and wakes up about 4 times during the night. He does not work shifts:  . Danish Garcia indicates he does get too little sleep at night. His bedtime is 1100. He awakens at 0600. He does not take naps. He takes   naps a week lasting  . He has the following observed behaviors: Loud snoring, Light snoring, Sleep talking, Pauses in breathing, Sitting up in bed while still asleep, Kicking with legs (Nightmares, Vivid dreams);  . Other remarks: He works as a pharmacy . Baxter Sleepiness Score: 12   which reflect mild daytime drowsiness.     Allergies   Allergen Reactions    Penicillins Hives         Current Outpatient Medications:     aspirin 81 mg chewable tablet, Take 81 mg by mouth in the morning., Disp: , Rfl:     Farxiga 10 mg tab tablet, , Disp: , Rfl: furosemide (LASIX) 40 mg tablet, furosemide 40 mg tablet  Take 1 tablet every day by oral route., Disp: , Rfl:     metoprolol succinate (TOPROL-XL) 50 mg XL tablet, metoprolol succinate ER 50 mg tablet,extended release 24 hr, Disp: , Rfl:     potassium chloride (Klor-Con M20) 20 mEq tablet, Klor-Con M20 mEq tablet,extended release, Disp: , Rfl:     sacubitriL-valsartan (Entresto) 24-26 mg tablet, Entresto 24 mg-26 mg tablet, Disp: , Rfl:     spironolactone (ALDACTONE) 25 mg tablet, spironolactone 25 mg tablet, Disp: , Rfl:      He  has a past medical history of Bell's palsy, Heart failure (HCC), and 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 He  has no past surgical history on file. He family history includes Cancer in his paternal grandfather and paternal grandmother; Thyroid Disease in his mother. He  reports that he has quit smoking. His smoking use included cigarettes. He has never used smokeless tobacco. He reports that he does not currently use alcohol. He reports that he does not use drugs. Review of Systems:  Constitutional:  80 pound weight loss  Eyes:  No blurred vision.   CVS:  No significant chest pain  Pulm:  No significant shortness of breath currently  GI:  No significant nausea or vomiting  :  ++ significant nocturia  Musculoskeletal:  No significant joint pain at night  Skin:  No significant rashes  Neuro:  No significant dizziness   Psych:  No active mood issues    Sleep Review of Systems: notable for no difficulty falling asleep; +frequent awakenings at night;  regular dreaming noted; no nightmares ; no early morning headaches; no memory problems; no concentration issues       Objective:   Visit Vitals  BP (!) 132/90 (BP 1 Location: Right upper arm, BP Patient Position: Sitting, BP Cuff Size: Thigh)   Pulse 92   Temp 100.4 °F (38 °C) (Temporal)   Resp 18   Ht 6' 3\" (1.905 m)   Wt 307 lb (139.3 kg)   SpO2 93%   BMI 38.37 kg/m²         General:   Not in acute distress   Eyes:  Anicteric sclerae, no obvious strabismus   Nose:  No obvious nasal septum deviation    Oropharynx:   Class 3 oropharyngeal outlet, thick tongue base, enlarged and boggy uvula, l    Tonsils:   tonsils are absent   Neck:    ;18 inches, midline trachea   Chest/Lungs:  Equal lung expansion, clear on auscultation    CVS:  Normal rate, regular rhythm; no JVD   Skin:  Warm to touch; no obvious rashes   Neuro:  No focal deficits ; no obvious tremor    Psych:  Normal affect,  normal countenance;          Assessment:       ICD-10-CM ICD-9-CM    1. Obstructive sleep apnea (adult) (pediatric)  G47.33 327.23 SLEEP STUDY UNATTENDED, 4 CHANNEL      2. Acute congestive heart failure, unspecified heart failure type (HCC)  I50.9 428.0       3. Obesity, Class II, BMI 35-39.9  E66.9 278.00             Plan:     * The patient currently has a High Risk for having sleep apnea. STOP-BANG score 6.  * HSAT was ordered for initial evaluation. * He was provided information on sleep apnea including coresponding risk factors and the importance of proper treatment. * Counseling was provided regarding proper sleep hygiene and safe driving. *Treatment options for sleep apnea were reviewed. he would like to proceed with a trial of PAP if found to have significant apnea. He understands alcohol and cigarette smoking worsen sleep apnea and should continue to be avoided. 2. Congestive heart failure- recent EF 25-30 %. Doing better (able to lie flat now). He will continue his current regimen and will follow up at his scheduled follow up with cardiology. Echo to be repeated in November. 3. Obesity - weight has been going down. I have discussed the relationship of weight to obstructive sleep apnea. I have advised him to continue his weight loss plan. Exercise can further assist with weight loss/maintenance. He has started going back to the gym. he understands that weight loss can reduce severity of sleep apnea and snoring. The treatment plan was reviewed with the patient in detail . he understands that the lead technologist will be calling him  with the results or notifying of results via [address] [place] [address] and assisting with the next step in the treatment plan as outlined today during the consultation with me. All of his questions were addressed. Thank you for allowing us to participate in your patient's medical care. We'll keep you updated on these investigations.   Electronically signed by    Jc Canales MD  Diplomate in Sleep Medicine  [facility]  10/4/2022

## 2022-10-04 NOTE — PATIENT INSTRUCTIONS
217 [facility]., [place]. [place], [address]  Tel.  [phone]  Fax. 100 [facility] 60  [place], [address] [facility]  Tel.  [phone]  Fax. [phone] [address]  Tel.  [phone]  Fax. [phone]     Sleep Apnea: After Your Visit  Your Care Instructions  Sleep apnea occurs when you frequently stop breathing for 10 seconds or longer during sleep. It can be mild to severe, based on the number of times per hour that you stop breathing or have slowed breathing. Blocked or narrowed airways in your nose, mouth, or throat can cause sleep apnea. Your airway can become blocked when your throat muscles and tongue relax during sleep. Sleep apnea is common, occurring in 1 out of 20 individuals. Individuals having any of the following characteristics should be evaluated and treated right away due to high risk and detrimental consequences from untreated sleep apnea:  Obesity  Congestive Heart failure  Atrial Fibrillation  Uncontrolled Hypertension  Type II Diabetes  Night-time Arrhythmias  Stroke  Pulmonary Hypertension  High-risk Driving Populations (pilots, truck drivers, etc.)  Patients Considering Weight-loss Surgery    How do you know you have sleep apnea? You probably have sleep apnea if you answer 'yes' to 3 or more of the following questions:  S - Have you been told that you Snore? T - Are you often Tired during the day? O - Has anyone Observed you stop breathing while sleeping? P- Do you have (or are being treated for) high blood Pressure? B - Are you obese (Body Mass Index > 35)? A - Is your Age 48years old or older? N - Is your Neck size greater than 16 inches? G - Are you male Gender? A sleep physician can prescribe a breathing device that prevents tissues in the throat from blocking your airway. Or your doctor may recommend using a dental device (oral breathing device) to help keep your airway open.  In some cases, surgery may be needed to remove enlarged tissues in the throat. Follow-up care is a key part of your treatment and safety. Be sure to make and go to all appointments, and call your doctor if you are having problems. It's also a good idea to know your test results and keep a list of the medicines you take. How can you care for yourself at home? Lose weight, if needed. It may reduce the number of times you stop breathing or have slowed breathing. Go to bed at the same time every night. Sleep on your side. It may stop mild apnea. If you tend to roll onto your back, sew a pocket in the back of your [employer] top. Put a tennis ball into the pocket, and stitch the pocket shut. This will help keep you from sleeping on your back. Avoid alcohol and medicines such as sleeping pills and sedatives before bed. Do not smoke. Smoking can make sleep apnea worse. If you need help quitting, talk to your doctor about stop-smoking programs and medicines. These can increase your chances of quitting for good. Prop up the head of your bed 4 to 6 inches by putting bricks under the legs of the bed. Treat breathing problems, such as a stuffy nose, caused by a cold or allergies. Use a continuous positive airway pressure (CPAP) breathing machine if lifestyle changes do not help your apnea and your doctor recommends it. The machine keeps your airway from closing when you sleep. If CPAP does not help you, ask your doctor whether you should try other breathing machines. A bilevel positive airway pressure machine has two types of air pressureâone for breathing in and one for breathing out. Another device raises or lowers air pressure as needed while you breathe. If your nose feels dry or bleeds when using one of these machines, talk with your doctor about increasing moisture in the air. A humidifier may help.   If your nose is runny or stuffy from using a breathing machine, talk with your doctor about using decongestants or a corticosteroid nasal spray.  When should you call for help? Watch closely for changes in your health, and be sure to contact your doctor if:  You still have sleep apnea even though you have made lifestyle changes. You are thinking of trying a device such as CPAP. You are having problems using a CPAP or similar machine. Where can you learn more? Go to [employer]. Enter N728 in the search box to learn more about \"Sleep Apnea: After Your Visit. \"   © 5406-3713 Healthwise, Incorporated. Care instructions adapted under license by [employer] (which disclaims liability or warranty for this information). This care instruction is for use with your licensed healthcare professional. If you have questions about a medical condition or this instruction, always ask your healthcare professional. Mardene Amass any warranty or liability for your use of this information. PROPER SLEEP HYGIENE    What to avoid  Do not have drinks with caffeine, such as coffee or black tea, for 8 hours before bed. Do not smoke or use other types of tobacco near bedtime. Nicotine is a stimulant and can keep you awake. Avoid drinking alcohol late in the evening, because it can cause you to wake in the middle of the night. Do not eat a big meal close to bedtime. If you are hungry, eat a light snack. Do not drink a lot of water close to bedtime, because the need to urinate may wake you up during the night. Do not read or watch TV in bed. Use the bed only for sleeping and sexual activity. What to try  Go to bed at the same time every night, and wake up at the same time every morning. Do not take naps during the day. Keep your bedroom quiet, dark, and cool. Get regular exercise, but not within 3 to 4 hours of your bedtime. .  Sleep on a comfortable pillow and mattress. If watching the clock makes you anxious, turn it facing away from you so you cannot see the time.   If you worry when you lie down, start a worry book. Well before bedtime, write down your worries, and then set the book and your concerns aside. Try meditation or other relaxation techniques before you go to bed. If you cannot fall asleep, get up and go to another room until you feel sleepy. Do something relaxing. Repeat your bedtime routine before you go to bed again. Make your house quiet and calm about an hour before bedtime. Turn down the lights, turn off the TV, log off the computer, and turn down the volume on music. This can help you relax after a busy day. Drowsy Driving  The [address] [place] [address] Road Traffic Safety Administration cites drowsiness as a causing factor in more than 879,952 police reported crashes annually, resulting in 76,000 injuries and 1,500 deaths. Other surveys suggest 55% of people polled have driven while drowsy in the past year, 23% had fallen asleep but not crashed, 3% crashed, and 2% had and accident due to drowsy driving. Who is at risk? Young Drivers: One study of drowsy driving accidents states that 55% of the drivers were under 25 years. Of those, 75% were male. Shift Workers and Travelers: People who work overnight or travel across time zones frequently are at higher risk of experiencing Circadian Rhythm Disorders. They are trying to work and function when their body is programed to sleep. Sleep Deprived: Lack of sleep has a serious impact on your ability to pay attention or focus on a task. Consistently getting less than the average of 8 hours your body needs creates partial or cumulative sleep deprivation. Untreated Sleep Disorders: Sleep Apnea, Narcolepsy, R.L.S., and other sleep disorders (untreated) prevent a person from getting enough restful sleep. This leads to excessive daytime sleepiness and increases the risk for drowsy driving accidents by up to 7 times. Medications / Alcohol: Even over the counter medications can cause drowsiness.  Medications that impair a drivers attention should have a warning label. Alcohol naturally makes you sleepy and on its own can cause accidents. Combined with excessive drowsiness its effects are amplified. Signs of Drowsy Driving:   * You don't remember driving the last few miles   * You may drift out of your soraya   * You are unable to focus and your thoughts wander   * You may yawn more often than normal   * You have difficulty keeping your eyes open / nodding off   * Missing traffic signs, speeding, or tailgating  Prevention-   Good sleep hygiene, lifestyle and behavioral choices have the most impact on drowsy driving. There is no substitute for sleep and the average person requires 8 hours nightly. If you find yourself driving drowsy, stop and sleep. Consider the sleep hygiene tips provided during your visit as well. Medication Refill Policy: Refills for all medications require 1 week advance notice. Please have your pharmacy fax a refill request. We are unable to fax, or call in \"controled substance\" medications and you will need to pick these prescriptions up from our office. [employer]zy Activation    Thank you for requesting access to [employer]zy. Please follow the instructions below to securely access and download your online medical record. eCozy allows you to send messages to your doctor, view your test results, renew your prescriptions, schedule appointments, and more. How Do I Sign Up? In your internet browser, go to [email or web address]. Click on the First Time User? Click Here link in the Sign In box. You will see the New Member Sign Up page. Enter your eCozy Access Code exactly as it appears below. You will not need to use this code after youve completed the sign-up process. If you do not sign up before the expiration date, you must request a new code. eCozy Access Code:  Activation code not generated  Current eCozy Status: Active (This is the date your eCozy access code will )    Enter the last four digits of your Social Security Number (xxxx) and Date of Birth (mm/dd/yyyy) as indicated and click Submit. You will be taken to the next sign-up page. Create a "[employer] ID. This will be your "[employer] login ID and cannot be changed, so think of one that is secure and easy to remember. Create a "[employer] password. You can change your password at any time. Enter your Password Reset Question and Answer. This can be used at a later time if you forget your password. Enter your e-mail address. You will receive e-mail notification when new information is available in [address] Click Sign Up. You can now view and download portions of your medical record. Click the [employer] link to download a portable copy of your medical information. Additional Information    If you have questions, please call [phone]. Remember, "[employer] is NOT to be used for urgent needs. For medical emergencies, dial 911.

## 2023-05-23 RX ORDER — ASPIRIN 81 MG/1
81 TABLET, CHEWABLE ORAL DAILY
COMMUNITY

## 2023-05-23 RX ORDER — SPIRONOLACTONE 25 MG/1
TABLET ORAL
COMMUNITY

## 2023-05-23 RX ORDER — SACUBITRIL AND VALSARTAN 24; 26 MG/1; MG/1
TABLET, FILM COATED ORAL
COMMUNITY

## 2023-05-23 RX ORDER — METOPROLOL SUCCINATE 50 MG/1
TABLET, EXTENDED RELEASE ORAL
COMMUNITY

## 2023-05-23 RX ORDER — FUROSEMIDE 40 MG/1
TABLET ORAL
COMMUNITY

## 2023-05-23 RX ORDER — POTASSIUM CHLORIDE 20 MEQ/1
TABLET, EXTENDED RELEASE ORAL
COMMUNITY